# Patient Record
Sex: FEMALE | Race: WHITE | Employment: OTHER | URBAN - NONMETROPOLITAN AREA
[De-identification: names, ages, dates, MRNs, and addresses within clinical notes are randomized per-mention and may not be internally consistent; named-entity substitution may affect disease eponyms.]

---

## 2020-08-04 ENCOUNTER — APPOINTMENT (OUTPATIENT)
Dept: CT IMAGING | Age: 85
DRG: 552 | End: 2020-08-04
Payer: MEDICARE

## 2020-08-04 ENCOUNTER — HOSPITAL ENCOUNTER (INPATIENT)
Age: 85
LOS: 6 days | Discharge: NURSING, REHAB OR LONG-TERM CARE | DRG: 552 | End: 2020-08-10
Attending: EMERGENCY MEDICINE | Admitting: SURGERY
Payer: MEDICARE

## 2020-08-04 ENCOUNTER — APPOINTMENT (OUTPATIENT)
Dept: GENERAL RADIOLOGY | Age: 85
DRG: 552 | End: 2020-08-04
Payer: MEDICARE

## 2020-08-04 PROBLEM — S12.100A CLOSED DISPLACED FRACTURE OF SECOND CERVICAL VERTEBRA (HCC): Status: ACTIVE | Noted: 2020-08-04

## 2020-08-04 PROBLEM — S12.120A CLOSED TYPE III FRACTURE OF ODONTOID PROCESS (HCC): Status: ACTIVE | Noted: 2020-08-04

## 2020-08-04 PROBLEM — N30.00 ACUTE CYSTITIS WITHOUT HEMATURIA: Status: ACTIVE | Noted: 2020-08-04
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
## 2020-08-04 LAB
BACTERIA: ABNORMAL /HPF
BASOPHILS # BLD: 0.1 %
BASOPHILS ABSOLUTE: 0 THOU/MM3 (ref 0–0.1)
BILIRUBIN URINE: NEGATIVE
BLOOD, URINE: ABNORMAL
CASTS 2: ABNORMAL /LPF
CASTS UA: ABNORMAL /LPF
CHARACTER, URINE: CLEAR
COLOR: YELLOW
CRYSTALS, UA: ABNORMAL
EKG ATRIAL RATE: 75 BPM
EKG P AXIS: 62 DEGREES
EKG P-R INTERVAL: 184 MS
EKG Q-T INTERVAL: 420 MS
EKG QRS DURATION: 136 MS
EKG QTC CALCULATION (BAZETT): 469 MS
EKG R AXIS: -70 DEGREES
EKG T AXIS: 127 DEGREES
EKG VENTRICULAR RATE: 75 BPM
EOSINOPHIL # BLD: 0.1 %
EOSINOPHILS ABSOLUTE: 0 THOU/MM3 (ref 0–0.4)
EPITHELIAL CELLS, UA: ABNORMAL /HPF
ERYTHROCYTE [DISTWIDTH] IN BLOOD BY AUTOMATED COUNT: 13.8 % (ref 11.5–14.5)
ERYTHROCYTE [DISTWIDTH] IN BLOOD BY AUTOMATED COUNT: 49.1 FL (ref 35–45)
GLUCOSE BLD-MCNC: 103 MG/DL (ref 70–108)
GLUCOSE BLD-MCNC: 118 MG/DL (ref 70–108)
GLUCOSE BLD-MCNC: 123 MG/DL (ref 70–108)
GLUCOSE URINE: NEGATIVE MG/DL
HCT VFR BLD CALC: 37.4 % (ref 37–47)
HEMOGLOBIN: 11 GM/DL (ref 12–16)
IMMATURE GRANS (ABS): 0.04 THOU/MM3 (ref 0–0.07)
IMMATURE GRANULOCYTES: 0.4 %
KETONES, URINE: NEGATIVE
LEUKOCYTE ESTERASE, URINE: ABNORMAL
LYMPHOCYTES # BLD: 11.7 %
LYMPHOCYTES ABSOLUTE: 1.1 THOU/MM3 (ref 1–4.8)
MCH RBC QN AUTO: 28.4 PG (ref 26–33)
MCHC RBC AUTO-ENTMCNC: 29.4 GM/DL (ref 32.2–35.5)
MCV RBC AUTO: 96.6 FL (ref 81–99)
MISCELLANEOUS 2: ABNORMAL
MONOCYTES # BLD: 7.6 %
MONOCYTES ABSOLUTE: 0.7 THOU/MM3 (ref 0.4–1.3)
NITRITE, URINE: POSITIVE
NUCLEATED RED BLOOD CELLS: 0 /100 WBC
PH UA: 5 (ref 5–9)
PLATELET # BLD: 123 THOU/MM3 (ref 130–400)
PMV BLD AUTO: 10.1 FL (ref 9.4–12.4)
PROTEIN UA: NEGATIVE
RBC # BLD: 3.87 MILL/MM3 (ref 4.2–5.4)
RBC URINE: ABNORMAL /HPF
RENAL EPITHELIAL, UA: ABNORMAL
SEG NEUTROPHILS: 80.1 %
SEGMENTED NEUTROPHILS ABSOLUTE COUNT: 7.8 THOU/MM3 (ref 1.8–7.7)
SPECIFIC GRAVITY, URINE: 1.01 (ref 1–1.03)
UROBILINOGEN, URINE: 0.2 EU/DL (ref 0–1)
WBC # BLD: 9.8 THOU/MM3 (ref 4.8–10.8)
WBC UA: ABNORMAL /HPF
YEAST: ABNORMAL

## 2020-08-04 PROCEDURE — 6370000000 HC RX 637 (ALT 250 FOR IP): Performed by: NURSE PRACTITIONER

## 2020-08-04 PROCEDURE — 93005 ELECTROCARDIOGRAM TRACING: CPT | Performed by: NURSE PRACTITIONER

## 2020-08-04 PROCEDURE — 2580000003 HC RX 258: Performed by: NURSE PRACTITIONER

## 2020-08-04 PROCEDURE — 96374 THER/PROPH/DIAG INJ IV PUSH: CPT

## 2020-08-04 PROCEDURE — 6360000002 HC RX W HCPCS: Performed by: NURSE PRACTITIONER

## 2020-08-04 PROCEDURE — 6370000000 HC RX 637 (ALT 250 FOR IP)

## 2020-08-04 PROCEDURE — 99285 EMERGENCY DEPT VISIT HI MDM: CPT

## 2020-08-04 PROCEDURE — 99222 1ST HOSP IP/OBS MODERATE 55: CPT | Performed by: NEUROLOGICAL SURGERY

## 2020-08-04 PROCEDURE — 87077 CULTURE AEROBIC IDENTIFY: CPT

## 2020-08-04 PROCEDURE — 6820000001 HC L2 TRAUMA SURGERY EVALUATION

## 2020-08-04 PROCEDURE — APPSS180 APP SPLIT SHARED TIME > 60 MINUTES: Performed by: NURSE PRACTITIONER

## 2020-08-04 PROCEDURE — 82948 REAGENT STRIP/BLOOD GLUCOSE: CPT

## 2020-08-04 PROCEDURE — 71045 X-RAY EXAM CHEST 1 VIEW: CPT

## 2020-08-04 PROCEDURE — 85025 COMPLETE CBC W/AUTO DIFF WBC: CPT

## 2020-08-04 PROCEDURE — APPSS60 APP SPLIT SHARED TIME 46-60 MINUTES: Performed by: PHYSICIAN ASSISTANT

## 2020-08-04 PROCEDURE — 36415 COLL VENOUS BLD VENIPUNCTURE: CPT

## 2020-08-04 PROCEDURE — G0378 HOSPITAL OBSERVATION PER HR: HCPCS

## 2020-08-04 PROCEDURE — 2060000000 HC ICU INTERMEDIATE R&B

## 2020-08-04 PROCEDURE — 3209999900 CT INTERPRETATION OF OUTSIDE IMAGES

## 2020-08-04 PROCEDURE — 81001 URINALYSIS AUTO W/SCOPE: CPT

## 2020-08-04 PROCEDURE — 99222 1ST HOSP IP/OBS MODERATE 55: CPT | Performed by: SURGERY

## 2020-08-04 PROCEDURE — 74176 CT ABD & PELVIS W/O CONTRAST: CPT

## 2020-08-04 PROCEDURE — 87186 SC STD MICRODIL/AGAR DIL: CPT

## 2020-08-04 PROCEDURE — 87086 URINE CULTURE/COLONY COUNT: CPT

## 2020-08-04 RX ORDER — FUROSEMIDE 40 MG/1
40 TABLET ORAL DAILY
COMMUNITY

## 2020-08-04 RX ORDER — TRAMADOL HYDROCHLORIDE 50 MG/1
50 TABLET ORAL EVERY 6 HOURS PRN
Status: DISCONTINUED | OUTPATIENT
Start: 2020-08-04 | End: 2020-08-10 | Stop reason: HOSPADM

## 2020-08-04 RX ORDER — NITROGLYCERIN 0.4 MG/1
0.4 TABLET SUBLINGUAL EVERY 5 MIN PRN
Status: DISCONTINUED | OUTPATIENT
Start: 2020-08-04 | End: 2020-08-10 | Stop reason: HOSPADM

## 2020-08-04 RX ORDER — DILTIAZEM HYDROCHLORIDE 180 MG/1
180 CAPSULE, COATED, EXTENDED RELEASE ORAL DAILY
Status: DISCONTINUED | OUTPATIENT
Start: 2020-08-04 | End: 2020-08-10 | Stop reason: HOSPADM

## 2020-08-04 RX ORDER — SODIUM CHLORIDE 9 MG/ML
1000 INJECTION, SOLUTION INTRAVENOUS CONTINUOUS
Status: DISCONTINUED | OUTPATIENT
Start: 2020-08-04 | End: 2020-08-04 | Stop reason: SDUPTHER

## 2020-08-04 RX ORDER — ACETAMINOPHEN 500 MG
1000 TABLET ORAL NIGHTLY
COMMUNITY

## 2020-08-04 RX ORDER — DEXTROSE MONOHYDRATE 25 G/50ML
12.5 INJECTION, SOLUTION INTRAVENOUS PRN
Status: DISCONTINUED | OUTPATIENT
Start: 2020-08-04 | End: 2020-08-10 | Stop reason: HOSPADM

## 2020-08-04 RX ORDER — POLYETHYLENE GLYCOL 3350 17 G/17G
17 POWDER, FOR SOLUTION ORAL DAILY PRN
COMMUNITY

## 2020-08-04 RX ORDER — GRANULES FOR ORAL 3 G/1
3 POWDER ORAL ONCE
Status: COMPLETED | OUTPATIENT
Start: 2020-08-04 | End: 2020-08-04

## 2020-08-04 RX ORDER — MORPHINE SULFATE 2 MG/ML
2 INJECTION, SOLUTION INTRAMUSCULAR; INTRAVENOUS
Status: DISCONTINUED | OUTPATIENT
Start: 2020-08-04 | End: 2020-08-10 | Stop reason: HOSPADM

## 2020-08-04 RX ORDER — POLYETHYLENE GLYCOL 3350 17 G/17G
17 POWDER, FOR SOLUTION ORAL DAILY
Status: DISCONTINUED | OUTPATIENT
Start: 2020-08-04 | End: 2020-08-10 | Stop reason: HOSPADM

## 2020-08-04 RX ORDER — PROMETHAZINE HYDROCHLORIDE 25 MG/1
12.5 TABLET ORAL EVERY 6 HOURS PRN
Status: DISCONTINUED | OUTPATIENT
Start: 2020-08-04 | End: 2020-08-10 | Stop reason: HOSPADM

## 2020-08-04 RX ORDER — TRAMADOL HYDROCHLORIDE 50 MG/1
TABLET ORAL
Status: COMPLETED
Start: 2020-08-04 | End: 2020-08-04

## 2020-08-04 RX ORDER — SODIUM CHLORIDE 0.9 % (FLUSH) 0.9 %
10 SYRINGE (ML) INJECTION PRN
Status: DISCONTINUED | OUTPATIENT
Start: 2020-08-04 | End: 2020-08-10 | Stop reason: HOSPADM

## 2020-08-04 RX ORDER — FLUTICASONE PROPIONATE 50 MCG
1 SPRAY, SUSPENSION (ML) NASAL NIGHTLY
COMMUNITY

## 2020-08-04 RX ORDER — POLYETHYLENE GLYCOL 3350 17 G/17G
17 POWDER, FOR SOLUTION ORAL DAILY PRN
Status: DISCONTINUED | OUTPATIENT
Start: 2020-08-04 | End: 2020-08-10 | Stop reason: HOSPADM

## 2020-08-04 RX ORDER — POTASSIUM CHLORIDE 750 MG/1
10 TABLET, FILM COATED, EXTENDED RELEASE ORAL DAILY
COMMUNITY

## 2020-08-04 RX ORDER — METOPROLOL TARTRATE 50 MG/1
25 TABLET, FILM COATED ORAL 2 TIMES DAILY
Status: DISCONTINUED | OUTPATIENT
Start: 2020-08-04 | End: 2020-08-10 | Stop reason: HOSPADM

## 2020-08-04 RX ORDER — BUMETANIDE 1 MG/1
1 TABLET ORAL
Status: DISCONTINUED | OUTPATIENT
Start: 2020-08-05 | End: 2020-08-10 | Stop reason: HOSPADM

## 2020-08-04 RX ORDER — DEXTROSE MONOHYDRATE 50 MG/ML
100 INJECTION, SOLUTION INTRAVENOUS PRN
Status: DISCONTINUED | OUTPATIENT
Start: 2020-08-04 | End: 2020-08-10 | Stop reason: HOSPADM

## 2020-08-04 RX ORDER — MORPHINE SULFATE 4 MG/ML
4 INJECTION, SOLUTION INTRAMUSCULAR; INTRAVENOUS
Status: DISCONTINUED | OUTPATIENT
Start: 2020-08-04 | End: 2020-08-10 | Stop reason: HOSPADM

## 2020-08-04 RX ORDER — TRAMADOL HYDROCHLORIDE 50 MG/1
25 TABLET ORAL EVERY 6 HOURS PRN
Status: DISCONTINUED | OUTPATIENT
Start: 2020-08-04 | End: 2020-08-10 | Stop reason: HOSPADM

## 2020-08-04 RX ORDER — ACETAMINOPHEN 325 MG/1
650 TABLET ORAL EVERY 6 HOURS PRN
COMMUNITY

## 2020-08-04 RX ORDER — FAMOTIDINE 20 MG/1
20 TABLET, FILM COATED ORAL 2 TIMES DAILY
Status: DISCONTINUED | OUTPATIENT
Start: 2020-08-04 | End: 2020-08-04 | Stop reason: CLARIF

## 2020-08-04 RX ORDER — ISOSORBIDE MONONITRATE 30 MG/1
30 TABLET, EXTENDED RELEASE ORAL DAILY
Status: DISCONTINUED | OUTPATIENT
Start: 2020-08-04 | End: 2020-08-10 | Stop reason: HOSPADM

## 2020-08-04 RX ORDER — LIDOCAINE 50 MG/G
1 PATCH TOPICAL DAILY
COMMUNITY

## 2020-08-04 RX ORDER — ONDANSETRON 2 MG/ML
4 INJECTION INTRAMUSCULAR; INTRAVENOUS EVERY 6 HOURS PRN
Status: DISCONTINUED | OUTPATIENT
Start: 2020-08-04 | End: 2020-08-10 | Stop reason: HOSPADM

## 2020-08-04 RX ORDER — SERTRALINE HYDROCHLORIDE 100 MG/1
100 TABLET, FILM COATED ORAL NIGHTLY
COMMUNITY

## 2020-08-04 RX ORDER — FOLIC ACID/VIT B COMPLEX AND C 5 MG
1 TABLET ORAL DAILY
Status: DISCONTINUED | OUTPATIENT
Start: 2020-08-04 | End: 2020-08-10 | Stop reason: HOSPADM

## 2020-08-04 RX ORDER — SODIUM CHLORIDE 0.9 % (FLUSH) 0.9 %
10 SYRINGE (ML) INJECTION EVERY 12 HOURS SCHEDULED
Status: DISCONTINUED | OUTPATIENT
Start: 2020-08-04 | End: 2020-08-10 | Stop reason: HOSPADM

## 2020-08-04 RX ORDER — SODIUM CHLORIDE 9 MG/ML
INJECTION, SOLUTION INTRAVENOUS CONTINUOUS
Status: DISCONTINUED | OUTPATIENT
Start: 2020-08-04 | End: 2020-08-06

## 2020-08-04 RX ORDER — ATORVASTATIN CALCIUM 40 MG/1
40 TABLET, FILM COATED ORAL NIGHTLY
COMMUNITY

## 2020-08-04 RX ORDER — THIAMINE MONONITRATE (VIT B1) 100 MG
100 TABLET ORAL DAILY
COMMUNITY

## 2020-08-04 RX ORDER — NICOTINE POLACRILEX 4 MG
15 LOZENGE BUCCAL PRN
Status: DISCONTINUED | OUTPATIENT
Start: 2020-08-04 | End: 2020-08-10 | Stop reason: HOSPADM

## 2020-08-04 RX ORDER — OMEGA-3-ACID ETHYL ESTERS 1 G/1
2000 CAPSULE, LIQUID FILLED ORAL DAILY
Status: DISCONTINUED | OUTPATIENT
Start: 2020-08-04 | End: 2020-08-10 | Stop reason: HOSPADM

## 2020-08-04 RX ORDER — GLYBURIDE 2.5 MG/1
2.5 TABLET ORAL 2 TIMES DAILY WITH MEALS
Status: DISCONTINUED | OUTPATIENT
Start: 2020-08-04 | End: 2020-08-04

## 2020-08-04 RX ORDER — PANTOPRAZOLE SODIUM 40 MG/1
40 TABLET, DELAYED RELEASE ORAL
Status: DISCONTINUED | OUTPATIENT
Start: 2020-08-05 | End: 2020-08-10 | Stop reason: HOSPADM

## 2020-08-04 RX ORDER — LOSARTAN POTASSIUM 25 MG/1
25 TABLET ORAL DAILY
Status: DISCONTINUED | OUTPATIENT
Start: 2020-08-04 | End: 2020-08-10 | Stop reason: HOSPADM

## 2020-08-04 RX ORDER — ROSUVASTATIN CALCIUM 20 MG/1
20 TABLET, COATED ORAL DAILY
Status: DISCONTINUED | OUTPATIENT
Start: 2020-08-04 | End: 2020-08-10 | Stop reason: HOSPADM

## 2020-08-04 RX ORDER — ACETAMINOPHEN 325 MG/1
650 TABLET ORAL EVERY 4 HOURS PRN
Status: DISCONTINUED | OUTPATIENT
Start: 2020-08-04 | End: 2020-08-10 | Stop reason: HOSPADM

## 2020-08-04 RX ADMIN — SODIUM CHLORIDE, PRESERVATIVE FREE 10 ML: 5 INJECTION INTRAVENOUS at 13:58

## 2020-08-04 RX ADMIN — ISOSORBIDE MONONITRATE 30 MG: 30 TABLET ORAL at 20:35

## 2020-08-04 RX ADMIN — TRAMADOL HYDROCHLORIDE 25 MG: 50 TABLET, FILM COATED ORAL at 11:12

## 2020-08-04 RX ADMIN — TRAMADOL HYDROCHLORIDE 50 MG: 50 TABLET, FILM COATED ORAL at 20:35

## 2020-08-04 RX ADMIN — ONDANSETRON 4 MG: 2 INJECTION INTRAMUSCULAR; INTRAVENOUS at 14:00

## 2020-08-04 RX ADMIN — ROSUVASTATIN CALCIUM 20 MG: 20 TABLET, FILM COATED ORAL at 20:34

## 2020-08-04 RX ADMIN — FOSFOMYCIN TROMETHAMINE 1 PACKET: 3 POWDER ORAL at 14:04

## 2020-08-04 RX ADMIN — METOPROLOL TARTRATE 25 MG: 50 TABLET, FILM COATED ORAL at 20:35

## 2020-08-04 RX ADMIN — DILTIAZEM HYDROCHLORIDE 180 MG: 180 CAPSULE, COATED, EXTENDED RELEASE ORAL at 20:35

## 2020-08-04 RX ADMIN — LOSARTAN POTASSIUM 25 MG: 25 TABLET, FILM COATED ORAL at 20:35

## 2020-08-04 RX ADMIN — SODIUM CHLORIDE: 9 INJECTION, SOLUTION INTRAVENOUS at 13:54

## 2020-08-04 RX ADMIN — MORPHINE SULFATE 2 MG: 2 INJECTION, SOLUTION INTRAMUSCULAR; INTRAVENOUS at 14:01

## 2020-08-04 SDOH — HEALTH STABILITY: MENTAL HEALTH: HOW OFTEN DO YOU HAVE A DRINK CONTAINING ALCOHOL?: NEVER

## 2020-08-04 ASSESSMENT — ENCOUNTER SYMPTOMS
EYE PAIN: 0
SHORTNESS OF BREATH: 0
ANAL BLEEDING: 0
STRIDOR: 0
CHOKING: 0
APNEA: 0
SINUS PRESSURE: 0
EYE ITCHING: 0
COUGH: 0
CHEST TIGHTNESS: 0
SORE THROAT: 0
EYE REDNESS: 0
BLOOD IN STOOL: 0
TROUBLE SWALLOWING: 0
PHOTOPHOBIA: 0
DIARRHEA: 0
VOMITING: 0
ABDOMINAL DISTENTION: 0
BACK PAIN: 0
RHINORRHEA: 0
ABDOMINAL PAIN: 0
WHEEZING: 0
RECTAL PAIN: 0
EYE DISCHARGE: 0
VOICE CHANGE: 0
COLOR CHANGE: 0
ABDOMINAL PAIN: 1
NAUSEA: 0
FACIAL SWELLING: 0
CONSTIPATION: 0

## 2020-08-04 ASSESSMENT — PAIN DESCRIPTION - PAIN TYPE
TYPE: ACUTE PAIN

## 2020-08-04 ASSESSMENT — PAIN SCALES - GENERAL
PAINLEVEL_OUTOF10: 7
PAINLEVEL_OUTOF10: 5
PAINLEVEL_OUTOF10: 6
PAINLEVEL_OUTOF10: 4
PAINLEVEL_OUTOF10: 0
PAINLEVEL_OUTOF10: 6
PAINLEVEL_OUTOF10: 6
PAINLEVEL_OUTOF10: 5

## 2020-08-04 ASSESSMENT — PAIN DESCRIPTION - LOCATION
LOCATION: BACK
LOCATION: HEAD
LOCATION: NECK
LOCATION: NECK

## 2020-08-04 ASSESSMENT — PAIN - FUNCTIONAL ASSESSMENT
PAIN_FUNCTIONAL_ASSESSMENT: PREVENTS OR INTERFERES SOME ACTIVE ACTIVITIES AND ADLS
PAIN_FUNCTIONAL_ASSESSMENT: PREVENTS OR INTERFERES SOME ACTIVE ACTIVITIES AND ADLS
PAIN_FUNCTIONAL_ASSESSMENT: PREVENTS OR INTERFERES WITH MANY ACTIVE NOT PASSIVE ACTIVITIES

## 2020-08-04 ASSESSMENT — PAIN DESCRIPTION - PROGRESSION
CLINICAL_PROGRESSION: NOT CHANGED
CLINICAL_PROGRESSION: GRADUALLY WORSENING
CLINICAL_PROGRESSION: NOT CHANGED
CLINICAL_PROGRESSION: GRADUALLY IMPROVING

## 2020-08-04 ASSESSMENT — PAIN DESCRIPTION - DESCRIPTORS
DESCRIPTORS: ACHING;SORE;SHARP
DESCRIPTORS: ACHING
DESCRIPTORS: OTHER (COMMENT)

## 2020-08-04 ASSESSMENT — PAIN DESCRIPTION - ORIENTATION
ORIENTATION: ANTERIOR
ORIENTATION: LOWER

## 2020-08-04 ASSESSMENT — PAIN DESCRIPTION - FREQUENCY
FREQUENCY: INTERMITTENT
FREQUENCY: CONTINUOUS
FREQUENCY: CONTINUOUS

## 2020-08-04 ASSESSMENT — PAIN DESCRIPTION - ONSET
ONSET: ON-GOING
ONSET: ON-GOING
ONSET: GRADUAL

## 2020-08-04 NOTE — H&P
Trauma H&P  Dr. Geoffrey Ward     Patient:  Citlalli Valdez date: 8/4/2020   YOB: 1929 Date of Evaluation: 8/4/2020  MRN: [id number]  Acct: [de-identified]    Injury Date:08/03/20  Injury time: Morning  PCP: Alejandra Khalil MD   Referring physician: ER provider    Time of Trauma Surgeon Notification:  0986  Time of Trama LUPILLO Arrival: 0850  Time of Trauma Surgeon Arrival:  ~1100    Assessment:    Trauma by fall   Type III dens fracture  UTI  Abdominal pain  Plan:    Admit to 4A    Type III dens fracture   - Consult Neurosurgery   - Place [place] collar   - C-spine precautions   - Neuro checks   - Pain management   - CTA to R/O vertebral artery injury   - MRI of the cervical spine if pacemaker can be shut off     UTI   - Fosfomycin x1 dose    Abdominal pain   - Possible blood vs radiopaque medication in cecum   - Check CBC later this AM and again tomorrow AM    CAD, hyperlipidemia, Hx of pacemaker for SSS   - Resume home medications   - Consult Cardiology for clearance to shut off pacer for MRI    Pain management   - Ultram or Morphine PRN    Clear liquid diet, advance as tolerated  IVF hydration  Repeat labs in AM  Prophylaxis: SCDs, IS, C&DB, Pepcid, Miralax  PT, OT and SLP to evaluate and treat   consult - pt will need rehab side of [place]  Discharge dispo pending clinical course   - From [place] - will need continued rehab     Activation: []Level I (Trauma Alert) []Level II (Injury Call) [x]Level III (Trauma Consult) []Downgraded  Mode of Arrival: EMS transportation  Referring Facility: [facility]  Loss of Consciousness [x]No []Yes[]Unknown    Mechanism of Injury:  []Motor Vehicle crash   []Single Vehicle [] []Passenger []Scene Fatality []Front Seat  []Restrained   []Air Bag Deployed   []Ejected []Rollover []Pedestrian []Trapped   Type of vehicle:   Protective Devices:   []Motorcycle  Wearing Helmet []Yes []No  []Bicycle  Wearing Helmet []Yes []No  [x]Fall level: None   Occupational History    None   Social Needs    Financial resource strain: None    Food insecurity     Worry: None     Inability: None    Transportation needs     Medical: None     Non-medical: None   Tobacco Use    Smoking status: Never Smoker    Smokeless tobacco: Never Used   Substance and Sexual Activity    Alcohol use: None    Drug use: None    Sexual activity: None   Lifestyle    Physical activity     Days per week: None     Minutes per session: None    Stress: None   Relationships    Social connections     Talks on phone: None     Gets together: None     Attends Restorationism service: None     Active member of club or organization: None     Attends meetings of clubs or organizations: None     Relationship status: None    Intimate partner violence     Fear of current or ex partner: None     Emotionally abused: None     Physically abused: None     Forced sexual activity: None   Other Topics Concern    None   Social History Narrative    None     History reviewed. No pertinent family history. Home medications:    Previous Medications    ASPIRIN 81 MG EC TABLET    Take 81 mg by mouth daily. B COMPLEX VITAMINS (VITAMIN B COMPLEX PO)    Take  by mouth daily. BUMETANIDE (BUMEX) 1 MG TABLET    Take 1 mg by mouth three times a week. CELECOXIB (CELEBREX) 400 MG CAPSULE    Take 400 mg by mouth daily. CLOPIDOGREL (PLAVIX) 75 MG TABLET    Take 75 mg by mouth daily. COENZYME Q10 (CO Q 10) 100 MG CAPS    Take  by mouth daily. DILTIAZEM (DILACOR XR) 180 MG XR CAPSULE    Take 180 mg by mouth daily. ESOMEPRAZOLE (NEXIUM) 20 MG CAPSULE    Take 20 mg by mouth daily. GLYBURIDE (DIABETA) 2.5 MG TABLET    Take 1 tablet by mouth 2 times daily (with meals). ISOSORBIDE MONONITRATE (IMDUR) 30 MG CR TABLET    Take 30 mg by mouth daily. LOSARTAN (COZAAR) 25 MG TABLET    Take 25 mg by mouth daily.     METFORMIN (GLUCOPHAGE) 500 MG TABLET    Take 1 tablet by mouth 2 times daily (with Patient has pacemaker need to evaluate to see if it can be turned off and patient have MRI to evaluate for possible ligamentous injury. Recommendations per neurosurgery will need some type of rehab care she currently is in assisted living. All data and imaging reviewed. Agree as documented. Care coordinated directly with SUSAN Olivo.

## 2020-08-04 NOTE — PROGRESS NOTES
Pt admitted to  [address] [place] [address] from ED. Complaints: None. IV site free of s/s of infection or infiltration. Vital signs obtained. Assessment and data collection initiated. Two nurse skin assessment performed by Halina Chairez and Dany. Oriented to room. Policies and procedures for 4A explained. All questions answered with no further questions at this time. Fall prevention and safety brochure discussed with patient. Bed alarm on.  Call light in reach.      @ 8/4/2020 4:48 PM

## 2020-08-04 NOTE — ED NOTES
ED to inpatient nurses report    Chief Complaint   Patient presents with    Fall    Neck Injury     C2 FX      Present to ED from nursing home  LOC: alert and orientated to name, place, date  Vital signs   Vitals:    08/04/20 0731 08/04/20 0907   BP: (!) 161/71 (!) 155/64   Pulse: 76 71   Resp: 20 18   Temp: 98.1 °F (36.7 °C)    TempSrc: Oral    SpO2: 94% 93%   Weight: 155 lb (70.3 kg)    Height: 5' 5\" (1.651 m)       Oxygen Baseline RA    Current needs required 2 L NC Bipap/Cpap No  LDAs:   Peripheral IV 08/04/20 Antecubital (Active)   Site Assessment Clean;Dry; Intact 08/04/20 0738     Mobility: Requires assistance * 2  Pending ED orders: NA  Present condition: STABLE    Electronically signed by Juan C Godwin RN on 8/4/2020 at 9:27 AM     Juan C Godwin RN  08/04/20 6322

## 2020-08-04 NOTE — ED NOTES
Patient transported to Radiology department via [facility] tech in stable condition.        Candido Ludwig, ZHANNA  08/04/20 9271

## 2020-08-04 NOTE — ED NOTES
Spoke with Joyce Damon with [facility]. States she needs an order from cardiologist to turn pacemaker off for MRI.       Minor Mohr RN  08/04/20 1968

## 2020-08-04 NOTE — ED PROVIDER NOTES
[facility] ENCOUNTER        Pt Name: Ruby Osborn  MRN: [id number]  Jayleengfjosé 12/16/1929  Date of evaluation: 8/4/2020  Treating Resident Physician: Mckeon MD  Supervising Physician: Ancelmo Dubose MD    [address] [place] [address]       Chief Complaint   Patient presents with    Fall    Neck Injury     C2 FX     Patient interviewed and examined by me immediately on arrival.        Further information obtained after primary survey and initial critical actions were performed. History obtained from the patient and daughter. Patient had a fall at nursing home from standing, yesterday morning, striking her head on the ground. Patient denies loss of consciousness. Patient has continued posterior neck pain since that time. Early this morning, she went to [facility], where she was found to have a type III odontoid fracture. Patient was transferred to us at that point. Upon presentation, trauma and spine were consulted. Patient did not have hearing aids with her, and was only able to answer simple questions. Patient denied any pain except her neck. Patient denied any difficulty breathing. Patient denied any paresthesias throughout her body. PRIMARY SURVEY AND INTERVENTION   Airway: Patent. Breathing: Regular respiratory pattern, symmetric chest rise, lungs clear to auscultation. Circulation: Good capillary refill, no identifiable external bleeding, good pulses in all extremities. Exposure: No additional injuries identified. Disability: No focal neurological deficit. Patient awake. eFAST: Per Dr. Carina Sullivan, some possible free fluid on LUQ. no pericardial effusion, no identifiable pneumothorax. See full report below. INITIAL MEDICAL DECISION MAKING   Initial assessment: 80-year-old female past medical history of pacemaker placement,.   PLAN: Large bore IV lines, cardiac/respiratory monitoring, labs, analgesia, trauma team activated prior to arrival, bedside US, observation. SECONDARY SURVEY AND INTERVENTION  HISTORY OF PRESENT ILLNESS    HPI  Brie Gerer is a 80 y.o. female who presents to the emergency department transferred from [facility] for evaluation of type III odontoid process fracture after mechanical fall. Patient is now attesting to a small amount of left-sided abdominal and pelvic pain. Denies any other complaints. \    Patient came from [facility] with CT of head and neck, showing type III odontoid fracture. The patient has no other acute complaints at this time. REVIEW OF SYSTEMS   Review of Systems   Constitutional: Negative for chills, fatigue and fever. HENT: Negative for trouble swallowing and voice change. Eyes: Negative for photophobia and visual disturbance. Respiratory: Negative for apnea, cough, choking, chest tightness, shortness of breath, wheezing and stridor. Cardiovascular: Negative for chest pain, palpitations and leg swelling. Gastrointestinal: Positive for abdominal pain (Mild left-sided). Negative for abdominal distention, anal bleeding, blood in stool, constipation, diarrhea, nausea, rectal pain and vomiting. Endocrine: Negative for polyphagia and polyuria. Genitourinary: Positive for pelvic pain (Left-sided). Negative for difficulty urinating, dysuria, flank pain, frequency and urgency. Musculoskeletal: Positive for neck pain and neck stiffness. Negative for gait problem (Was walking, though painfully, until came to [facility] ED.) and joint swelling. Neurological: Negative for dizziness, tremors, seizures, syncope, facial asymmetry, speech difficulty, weakness, light-headedness, numbness and headaches.          PAST MEDICAL AND SURGICAL HISTORY     Past Medical History:   Diagnosis Date    Anemia     Arthritis     Atherosclerotic heart disease     CHF (congestive heart failure) ([facility])     Chronic kidney disease, stage III (moderate) ([facility])     Constipation Allergen Reactions    Fluoxetine     Levofloxacin     Pcn [Penicillins]     Quinolones      Unable to confirm at this moment, Except as available on EMR. FAMILY HISTORY   History reviewed. No pertinent family history. Unable to confirm at this moment, Except as available on EMR. PREVIOUS RECORDS   Previous records reviewed: Past medical history, past surgical history, allergies, social history        PHYSICAL EXAM     ED Triage Vitals [08/04/20 0731]   BP Temp Temp Source Pulse Resp SpO2 Height Weight   (!) 161/71 98.1 °F (36.7 °C) Oral 76 20 94 % 5' 5\" (1.651 m) 155 lb (70.3 kg)     Initial vital signs and nursing assessment reviewed and normal. Pulsoximetry is normal per my interpretation. Additional Vital Signs:  Vitals:    08/05/20 0400   BP:    Pulse:    Resp:    Temp:    SpO2: 96%       EKG monitor: Normal sinus rhythm    Physical Exam  Constitutional:       General: She is not in acute distress. Appearance: Normal appearance. She is normal weight. She is not ill-appearing, toxic-appearing or diaphoretic. HENT:      Head: Normocephalic. Comments: Small abrasion to right forehead. No active bleeding. Right Ear: Tympanic membrane and external ear normal.      Left Ear: Tympanic membrane and external ear normal.      Nose: Nose normal.      Mouth/Throat:      Mouth: Mucous membranes are moist.      Pharynx: Oropharynx is clear. Eyes:      Extraocular Movements: Extraocular movements intact. Conjunctiva/sclera: Conjunctivae normal.      Pupils: Pupils are equal, round, and reactive to light. Neck:      Comments: And C-spine restrictions, due to fracture. Did not evaluate stability nor range of motion. Cardiovascular:      Rate and Rhythm: Normal rate and regular rhythm. Pulses: Normal pulses. Heart sounds: Normal heart sounds. Pulmonary:      Effort: Pulmonary effort is normal.      Breath sounds: Normal breath sounds.    Abdominal:      General: Abdomen is the upper C2 vertebral body extending into the left lateral mass with displacement of fragments into the left C2 foramen transversarium result in a moderate to severe stenosis of the foramen. Recommend neck CTA to evaluate    for possible left vertebral artery injury. Multilevel degenerative disc disease and DJD. Multilevel disc osteophyte complexes with varying severity spinal stenosis and cord impingement at C4-5. **This report has been created using voice recognition software. It may contain minor errors which are inherent in voice recognition technology. **      Final report electronically signed by Dr. Alexandru Christianson on 8/4/2020 9:25 PM      CT ABDOMEN PELVIS WO CONTRAST Additional Contrast? None   Final Result   1. Very small amount of high-density fluid in the cecum which may be related to radiopaque medication intraluminal blood is not entirely excludable. 2. No evidence of acute fracture or suspicious bone lesion. 3. Remote nonunion fracture left ninth rib   Other chronic findings are detailed in the above report            **This report has been created using voice recognition software. It may contain minor errors which are inherent in voice recognition technology. **      Final report electronically signed by Dr. Reji Leyva on 8/4/2020 9:10 AM      XR CHEST PORTABLE   Final Result   1. There is no acute cardiopulmonary process. 2. If there is clinical concern for rib fractures, then a CT examination of the chest would be recommended. **This report has been created using voice recognition software. It may contain minor errors which are inherent in voice recognition technology. **      Final report electronically signed by Dr. Cedric Hays on 8/4/2020 8:16 AM          ED Medications administered this visit:   Medications   sodium chloride flush 0.9 % injection 10 mL (10 mLs Intravenous Not Given 8/4/20 2034)   sodium chloride flush 0.9 % injection 10 mL (has no administration in time range)   acetaminophen (TYLENOL) tablet 650 mg (has no administration in time range)   polyethylene glycol (GLYCOLAX) packet 17 g (has no administration in time range)   promethazine (PHENERGAN) tablet 12.5 mg ( Oral See Alternative 8/4/20 1400)     Or   ondansetron (ZOFRAN) injection 4 mg (4 mg Intravenous Given 8/4/20 1400)   0.9 % sodium chloride infusion ( Intravenous New Bag 8/4/20 1354)   traMADol (ULTRAM) tablet 25 mg ( Oral See Alternative 8/5/20 0343)     Or   traMADol (ULTRAM) tablet 50 mg (50 mg Oral Given 8/5/20 0343)   morphine (PF) injection 2 mg (2 mg Intravenous Given 8/4/20 1401)     Or   morphine injection 4 mg ( Intravenous See Alternative 8/4/20 1401)   polyethylene glycol (GLYCOLAX) packet 17 g (has no administration in time range)   glucose (GLUTOSE) 40 % oral gel 15 g (has no administration in time range)   dextrose 50 % IV solution (has no administration in time range)   glucagon (rDNA) injection 1 mg (has no administration in time range)   dextrose 5 % solution (has no administration in time range)   insulin lispro (HUMALOG) injection vial 0-6 Units (0 Units Subcutaneous Not Given 8/4/20 1837)   insulin lispro (HUMALOG) injection vial 0-3 Units (0 Units Subcutaneous Not Given 8/4/20 2012)   pantoprazole (PROTONIX) tablet 40 mg (40 mg Oral Given 8/5/20 0623)   folbee plus tablet 1 tablet (1 tablet Oral Not Given 8/4/20 1840)   bumetanide (BUMEX) tablet 1 mg (has no administration in time range)   dilTIAZem (CARDIZEM CD) extended release capsule 180 mg (180 mg Oral Given 8/4/20 2035)   isosorbide mononitrate (IMDUR) extended release tablet 30 mg (30 mg Oral Given 8/4/20 2035)   losartan (COZAAR) tablet 25 mg (25 mg Oral Given 8/4/20 2035)   metoprolol tartrate (LOPRESSOR) tablet 25 mg (25 mg Oral Given 8/4/20 2035)   nitroGLYCERIN (NITROSTAT) SL tablet 0.4 mg (has no administration in time range)   omega-3 acid ethyl esters (LOVAZA) capsule 2,000 mg (2,000 mg Oral Not Given 8/4/20 1838)   rosuvastatin (CRESTOR) tablet 20 mg (20 mg Oral Given 8/4/20 2034)   fosfomycin tromethamine (MONUROL) 3 g PACK 1 packet (1 packet Oral Given 8/4/20 1404)   iopamidol (ISOVUE-370) 76 % injection 80 mL (80 mLs Intravenous Given 8/5/20 0040)           POCUS eFAST   Date/Time: 08/05/20, refer to note by Dr. Fred Flores     Current Discharge Medication List            FINAL DISPOSITION     Final diagnoses:   Closed odontoid fracture with type III morphology, initial encounter ([facility] [address]     Condition: condition: stable  Dispo: Admit to trauma with spine consult      This transcription was electronically signed. It was dictated by use of voice recognition software and electronically transcribed. The transcription may contain errors not detected in proofreading.      Yazmin Mera MD  Resident  08/04/20 [address], [place]  08/05/20 3382

## 2020-08-04 NOTE — ED PROVIDER NOTES
Transfer of Care Note:   Physician Signing out: Seth Stack MD  Receiving Physician: Ladan Shirley DO  Sign out time: 1500      Brief history: This is a 79 y/o F who had a mechanical fall from standing onto the left side of the head yesterday AM. She had neck pain since the fall. Sought care at [facility] early this morning, and was diagnosed with a type 3 odontoid fx. She is admitted to the trauma service with spine consultation. Items pending that need to be checked:  None, she is tentatively admitted    Tentative Impression of patient:  1. Type III Odontoid Fracture    Expected disposition of patient:  Pending results, admitted.         Additional Assessment and results:       Vitals:    08/04/20 1403   BP: (!) 144/65   Pulse: 75   Resp: 11   Temp:    SpO2: 93%         Labs Reviewed   CBC WITH AUTO DIFFERENTIAL - Abnormal; Notable for the following components:       Result Value    RBC 3.87 (*)     Hemoglobin 11.0 (*)     MCHC 29.4 (*)     RDW-SD 49.1 (*)     Platelets 117 (*)     Segs Absolute 7.8 (*)     All other components within normal limits   URINE WITH REFLEXED MICRO - Abnormal; Notable for the following components:    Blood, Urine TRACE (*)     Nitrite, Urine POSITIVE (*)     Leukocyte Esterase, Urine MODERATE (*)     All other components within normal limits   POCT GLUCOSE - Abnormal; Notable for the following components:    POC Glucose 123 (*)     All other components within normal limits   CULTURE, REFLEXED, URINE    Narrative:     Source: urine, clean catch       Site:           Current Antibiotics: not stated   POCT GLUCOSE   POCT GLUCOSE         Medications   sodium chloride flush 0.9 % injection 10 mL (10 mLs Intravenous Given 8/4/20 1358)   sodium chloride flush 0.9 % injection 10 mL (has no administration in time range)   acetaminophen (TYLENOL) tablet 650 mg (has no administration in time range)   polyethylene glycol (GLYCOLAX) packet 17 g (has no administration in time range) transcription was electronically signed. It was dictated by use of voice recognition software and electronically transcribed. The transcription may contain errors not detected in proofreading.         Nuha Cunningham,   Resident  08/04/20 Nima Forbes,   Resident  08/04/20 0518

## 2020-08-04 NOTE — ED NOTES
Patient placed in aspen collar while maintaining cervical spine precautions      Nahum Gooden RN  08/04/20 6681

## 2020-08-04 NOTE — ED NOTES
Reassessment of the patients Fall and Neck Injury (C2 FX)   is unchanged, the patients pain reassessment is a 4/10, Side rails up times 2, call light in reach, will continue to monitor.      Marya Mejias RN  08/04/20 0103

## 2020-08-04 NOTE — ED NOTES
Patient resting in bed. Respirations easy and unlabored. No distress noted. Call light within reach. Updated on POC. Lights dimmed for comfort.       Vale Thakkar RN  08/04/20 0137

## 2020-08-04 NOTE — ED NOTES
ED to inpatient nurses report    Chief Complaint   Patient presents with    Fall    Neck Injury     C2 FX      Present to ED from nursing home  LOC: alert and orientated to name, place, date  Vital signs   Vitals:    08/04/20 0731 08/04/20 0907 08/04/20 1050 08/04/20 1403   BP: (!) 161/71 (!) 155/64 (!) 151/65 (!) 144/65   Pulse: 76 71 70 75   Resp: 20 18 16 11   Temp: 98.1 °F (36.7 °C)      TempSrc: Oral      SpO2: 94% 93% 93% 93%   Weight: 155 lb (70.3 kg)      Height: 5' 5\" (1.651 m)         Oxygen Baseline room air    Current needs required room air Bipap/Cpap No  LDAs:   Peripheral IV 08/04/20 Antecubital (Active)   Site Assessment Clean;Dry; Intact 08/04/20 0738     Mobility: Requires assistance * 2  Pending ED orders:   Present condition: stable    Electronically signed by Lenord Cranker, RN on 8/4/2020 at 4:07 PM       Lenord Cranker, RN  08/04/20 7216

## 2020-08-04 NOTE — PROGRESS NOTES
[facility]  SPEECH THERAPY MISSED TREATMENT NOTE  [facility] EMERGENCY DEPT      Date: 2020  Patient Name: Jerman Mckeon        MRN: [id number]    : 1929  (80 y.o.)    REASON FOR MISSED TREATMENT:  Novel order received from SUSAN Gerber given s/p fall. C2 fracture noted within chart review with consult to neurosurgery placed. Not appropriate for completion of assessments at this time given heightened acuity levels and incomplete H&P. Will f/u on  to complete assessments as indicated.       Alan Bautista M.A., [address] [place] [address]

## 2020-08-04 NOTE — ED NOTES
Patent transferred from [facility] for C2 fx. Patient lives at a assisted living and fell getting out of bed. Patient states she fell two days ago and had a headed of 10/10. On arrival to ER patient is alert and oriented. Patient has full PMS in all four extremities.  Calvillo catheter placed call light within reach     Maday Luciano RN  08/04/20 1672

## 2020-08-04 NOTE — PROCEDURES
EKG was handed to [facility],  [facility].

## 2020-08-05 ENCOUNTER — APPOINTMENT (OUTPATIENT)
Dept: CT IMAGING | Age: 85
DRG: 552 | End: 2020-08-05
Payer: MEDICARE

## 2020-08-05 LAB
ALBUMIN SERPL-MCNC: 3.5 G/DL (ref 3.5–5.1)
ALP BLD-CCNC: 72 U/L (ref 38–126)
ALT SERPL-CCNC: 14 U/L (ref 11–66)
ANION GAP SERPL CALCULATED.3IONS-SCNC: 12 MEQ/L (ref 8–16)
AST SERPL-CCNC: 13 U/L (ref 5–40)
BILIRUB SERPL-MCNC: 0.5 MG/DL (ref 0.3–1.2)
BUN BLDV-MCNC: 30 MG/DL (ref 7–22)
CALCIUM SERPL-MCNC: 8.1 MG/DL (ref 8.5–10.5)
CHLORIDE BLD-SCNC: 106 MEQ/L (ref 98–111)
CO2: 19 MEQ/L (ref 23–33)
CREAT SERPL-MCNC: 1.2 MG/DL (ref 0.4–1.2)
ERYTHROCYTE [DISTWIDTH] IN BLOOD BY AUTOMATED COUNT: 13.9 % (ref 11.5–14.5)
ERYTHROCYTE [DISTWIDTH] IN BLOOD BY AUTOMATED COUNT: 50 FL (ref 35–45)
GFR SERPL CREATININE-BSD FRML MDRD: 42 ML/MIN/1.73M2
GLUCOSE BLD-MCNC: 117 MG/DL (ref 70–108)
GLUCOSE BLD-MCNC: 186 MG/DL (ref 70–108)
GLUCOSE BLD-MCNC: 92 MG/DL (ref 70–108)
GLUCOSE BLD-MCNC: 92 MG/DL (ref 70–108)
GLUCOSE BLD-MCNC: 97 MG/DL (ref 70–108)
HCT VFR BLD CALC: 34.7 % (ref 37–47)
HEMOGLOBIN: 10.5 GM/DL (ref 12–16)
MCH RBC QN AUTO: 29.5 PG (ref 26–33)
MCHC RBC AUTO-ENTMCNC: 30.3 GM/DL (ref 32.2–35.5)
MCV RBC AUTO: 97.5 FL (ref 81–99)
PLATELET # BLD: 125 THOU/MM3 (ref 130–400)
PMV BLD AUTO: 10.8 FL (ref 9.4–12.4)
POTASSIUM REFLEX MAGNESIUM: 4.1 MEQ/L (ref 3.5–5.2)
RBC # BLD: 3.56 MILL/MM3 (ref 4.2–5.4)
SODIUM BLD-SCNC: 137 MEQ/L (ref 135–145)
TOTAL PROTEIN: 6 G/DL (ref 6.1–8)
WBC # BLD: 8.9 THOU/MM3 (ref 4.8–10.8)

## 2020-08-05 PROCEDURE — 2580000003 HC RX 258: Performed by: NURSE PRACTITIONER

## 2020-08-05 PROCEDURE — 36415 COLL VENOUS BLD VENIPUNCTURE: CPT

## 2020-08-05 PROCEDURE — 92610 EVALUATE SWALLOWING FUNCTION: CPT

## 2020-08-05 PROCEDURE — 97163 PT EVAL HIGH COMPLEX 45 MIN: CPT

## 2020-08-05 PROCEDURE — 96376 TX/PRO/DX INJ SAME DRUG ADON: CPT

## 2020-08-05 PROCEDURE — 80053 COMPREHEN METABOLIC PANEL: CPT

## 2020-08-05 PROCEDURE — 6360000004 HC RX CONTRAST MEDICATION: Performed by: NEUROLOGICAL SURGERY

## 2020-08-05 PROCEDURE — 92526 ORAL FUNCTION THERAPY: CPT

## 2020-08-05 PROCEDURE — G0378 HOSPITAL OBSERVATION PER HR: HCPCS

## 2020-08-05 PROCEDURE — 70498 CT ANGIOGRAPHY NECK: CPT

## 2020-08-05 PROCEDURE — 97530 THERAPEUTIC ACTIVITIES: CPT

## 2020-08-05 PROCEDURE — 82948 REAGENT STRIP/BLOOD GLUCOSE: CPT

## 2020-08-05 PROCEDURE — 85027 COMPLETE CBC AUTOMATED: CPT

## 2020-08-05 PROCEDURE — 93010 ELECTROCARDIOGRAM REPORT: CPT | Performed by: INTERNAL MEDICINE

## 2020-08-05 PROCEDURE — 2060000000 HC ICU INTERMEDIATE R&B

## 2020-08-05 PROCEDURE — APPSS30 APP SPLIT SHARED TIME 16-30 MINUTES: Performed by: PHYSICIAN ASSISTANT

## 2020-08-05 PROCEDURE — 99232 SBSQ HOSP IP/OBS MODERATE 35: CPT | Performed by: NEUROLOGICAL SURGERY

## 2020-08-05 PROCEDURE — 94760 N-INVAS EAR/PLS OXIMETRY 1: CPT

## 2020-08-05 PROCEDURE — 99233 SBSQ HOSP IP/OBS HIGH 50: CPT | Performed by: SURGERY

## 2020-08-05 PROCEDURE — 6360000002 HC RX W HCPCS: Performed by: NURSE PRACTITIONER

## 2020-08-05 PROCEDURE — 6370000000 HC RX 637 (ALT 250 FOR IP): Performed by: NURSE PRACTITIONER

## 2020-08-05 PROCEDURE — APPSS60 APP SPLIT SHARED TIME 46-60 MINUTES: Performed by: PHYSICIAN ASSISTANT

## 2020-08-05 RX ADMIN — TRAMADOL HYDROCHLORIDE 50 MG: 50 TABLET, FILM COATED ORAL at 03:43

## 2020-08-05 RX ADMIN — BUMETANIDE 1 MG: 1 TABLET ORAL at 09:37

## 2020-08-05 RX ADMIN — OMEGA-3-ACID ETHYL ESTERS 2000 MG: 1 CAPSULE, LIQUID FILLED ORAL at 09:40

## 2020-08-05 RX ADMIN — DILTIAZEM HYDROCHLORIDE 180 MG: 180 CAPSULE, COATED, EXTENDED RELEASE ORAL at 09:39

## 2020-08-05 RX ADMIN — TRAMADOL HYDROCHLORIDE 25 MG: 50 TABLET, FILM COATED ORAL at 09:44

## 2020-08-05 RX ADMIN — PANTOPRAZOLE SODIUM 40 MG: 40 TABLET, DELAYED RELEASE ORAL at 06:23

## 2020-08-05 RX ADMIN — METOPROLOL TARTRATE 25 MG: 50 TABLET, FILM COATED ORAL at 09:37

## 2020-08-05 RX ADMIN — SODIUM CHLORIDE: 9 INJECTION, SOLUTION INTRAVENOUS at 22:27

## 2020-08-05 RX ADMIN — Medication 1 TABLET: at 09:36

## 2020-08-05 RX ADMIN — IOPAMIDOL 80 ML: 755 INJECTION, SOLUTION INTRAVENOUS at 00:40

## 2020-08-05 RX ADMIN — MORPHINE SULFATE 2 MG: 2 INJECTION, SOLUTION INTRAMUSCULAR; INTRAVENOUS at 12:27

## 2020-08-05 RX ADMIN — ROSUVASTATIN CALCIUM 20 MG: 20 TABLET, FILM COATED ORAL at 09:40

## 2020-08-05 RX ADMIN — METOPROLOL TARTRATE 25 MG: 50 TABLET, FILM COATED ORAL at 00:00

## 2020-08-05 RX ADMIN — ISOSORBIDE MONONITRATE 30 MG: 30 TABLET ORAL at 09:36

## 2020-08-05 RX ADMIN — LOSARTAN POTASSIUM 25 MG: 25 TABLET, FILM COATED ORAL at 09:36

## 2020-08-05 ASSESSMENT — PAIN DESCRIPTION - PROGRESSION
CLINICAL_PROGRESSION: GRADUALLY WORSENING
CLINICAL_PROGRESSION: GRADUALLY IMPROVING

## 2020-08-05 ASSESSMENT — PAIN SCALES - GENERAL
PAINLEVEL_OUTOF10: 8
PAINLEVEL_OUTOF10: 3
PAINLEVEL_OUTOF10: 8
PAINLEVEL_OUTOF10: 5
PAINLEVEL_OUTOF10: 6
PAINLEVEL_OUTOF10: 6

## 2020-08-05 ASSESSMENT — PAIN DESCRIPTION - DESCRIPTORS
DESCRIPTORS: ACHING
DESCRIPTORS: ACHING

## 2020-08-05 ASSESSMENT — PAIN DESCRIPTION - ONSET
ONSET: ON-GOING
ONSET: ON-GOING

## 2020-08-05 ASSESSMENT — ENCOUNTER SYMPTOMS
NAUSEA: 0
CHEST TIGHTNESS: 0
ABDOMINAL PAIN: 0
SHORTNESS OF BREATH: 0
ABDOMINAL DISTENTION: 0
BACK PAIN: 0

## 2020-08-05 ASSESSMENT — PAIN DESCRIPTION - LOCATION
LOCATION: NECK
LOCATION: NECK

## 2020-08-05 ASSESSMENT — PAIN DESCRIPTION - PAIN TYPE
TYPE: ACUTE PAIN
TYPE: ACUTE PAIN

## 2020-08-05 ASSESSMENT — PAIN DESCRIPTION - FREQUENCY
FREQUENCY: INTERMITTENT
FREQUENCY: INTERMITTENT

## 2020-08-05 ASSESSMENT — PAIN - FUNCTIONAL ASSESSMENT: PAIN_FUNCTIONAL_ASSESSMENT: PREVENTS OR INTERFERES SOME ACTIVE ACTIVITIES AND ADLS

## 2020-08-05 NOTE — PLAN OF CARE
Problem: Falls - Risk of:  Goal: Will remain free from falls  Description: Will remain free from falls  8/5/2020 1035 by Jhony Montero RN  Outcome: Met This Shift  Note: Call light in reach, bed in lowest position, and bed alarm activated. Education given on use of call light before ambulation and when in need of assistance. Patient expressed understanding. Hourly visual checks performed and charted. Toileting offered to patient. No falls this shift, at any time. Arm band and falling star in place. Will continue to monitor. Problem: Skin Integrity:  Goal: Will show no infection signs and symptoms  Description: Will show no infection signs and symptoms  8/5/2020 1035 by Jhony Montero RN  Outcome: Met This Shift  Note: No signs of new skin breakdown with each assessment. Skin remains warm, dry, intact. Mucous membranes pink & moist. Patient is able to turn self. Problem: Safety:  Goal: Free from accidental physical injury  Description: Free from accidental physical injury  8/5/2020 1035 by Jhony Montero RN  Outcome: Met This Shift     Problem: Pain:  Description: Pain management should include both nonpharmacologic and pharmacologic interventions. Goal: Pain level will decrease  Description: Pain level will decrease  8/5/2020 1035 by Jhony Montero RN  Outcome: Ongoing     Problem: Discharge Planning:  Goal: Patients continuum of care needs are met  Description: Patients continuum of care needs are met  8/5/2020 1035 by Jhony Montero RN  Outcome: Ongoing  Note: Rehab at [facility] vs home   Care plan reviewed with patient. Patient verbalize understanding of the plan of care and contribute to goal setting.

## 2020-08-05 NOTE — PROGRESS NOTES
[address] [address]  INPATIENT PHYSICAL THERAPY  EVALUATION  [facility] 4A - 4A-08/008-A    Time In: 1563  Time Out: 1055  Timed Code Treatment Minutes: 19 Minutes  Minutes: 27     Date: 2020  Patient Name: Rowdy Murry,  Gender:  female        MRN: [id number]  : 1929  (80 y.o.)      Referring Practitioner: Breana DAVIS  Diagnosis: fall, accidental, initial encounter  Additional Pertinent Hx: Per H/P 64: \"80 year old female presenting as a [facility] for treatment of injuries sustained in a fall over 24 hours ago. She reports that she was getting out of bed and fell, hitting her head on the floor. She denies loss of consciousness. She states that she crawled on the floor trying to get up but ended up hitting her medical alert button and help arrived. States that she had some neck pain after the fall, but that she expected that. However, over the course of the 24 hours after the fall, the neck pain got worse prompting her to request evaluation at the ER. She was found to have a C2 fracture and was subsequently transferred for further care. Upon her arrival here, she complained of abdominal pain and a CT scan of the abdomen was obtained. She denies any headache, nausea, vomiting, paresthesias, chest pain, shortness of breath, fever, chills, constipation, diarrhea, back pain. She has no neurological deficits at presentation. \" No surgical intervention at this time, c-collar worn at all time. Restrictions/Precautions:  Restrictions/Precautions: Surgical Protocols, Fall Risk  Required Braces or Orthoses  Cervical: c-collar  Position Activity Restriction  Spinal Precautions: No Bending, No Lifting, No Twisting    Subjective:  Chart Reviewed: Yes  Patient assessed for rehabilitation services?: Yes  Family / Caregiver Present: Yes(daughter)  Subjective: RN approved PT session.  Pt supine in bed upon entry, daughter present, and pt agreeable to PT interventions. Pt pleasant and willing to participate in activity. Post session, pt in bedside chair with all needs in reach. Told pt to call nurse when ready to get back into bed secondary to cervical pain and pressure. Chair alarm on, RN aware. General:  Overall Orientation Status: Within Functional Limits  Follows Commands: Within Functional Limits    Vision: Impaired  Vision Exceptions: Wears glasses at all times    Hearing: Within functional limits         Pain: 6-7/10: Pt reports cervical pain that increased with sitting upright and ambulating. Pt reported it was decreasing once sitting in chair after session. Social/Functional History:    Lives With: Alone  Type of Home: (independent living)  Home Layout: One level  Home Access: Level entry  Home Equipment: Rolling walker, 4 wheeled walker, Cane        ADL Assistance: Independent     Ambulation Assistance: Independent  Transfer Assistance: Independent          Additional Comments: Pt reported use of RW at all times for mobility PTA. Pt denied use of her 4WW secondary to it being too big. Pt states she was active and walking frequently in the facility to play cards however has been bound to room with minimal activity secondary to co-vid restrictions. Pt also mentioned about 8 falls in the past year or so secondary to LOB when walking backwards.     OBJECTIVE:  Range of Motion:  Right Lower Extremity: WFL  Left Lower Extremity: WFL    Strength:  Right Lower Extremity: WFL  Left Lower Extremity: WFL    Balance:  Dynamic Sitting Balance: Contact Guard Assistance, with increased time for completion, performed functional task with CGA for steadiness and safety  Static Standing Balance: Contact Guard Assistance, use of RW  Dynamic Standing Balance: Contact Guard Assistance, with verbal cues , with increased time for completion, use of RW, posterior lean noted when backing up to chair    Bed Mobility:  Rolling to Right: Minimal Assistance, with head of bed safety and independence with functional mobility for improved independence and quality of life. Treatment included education on precautions and safety with transfers, transfer and gait training. Cues provided to ensure safety with bed mobility, transfers, and ambulation. Assessment: Body structures, Functions, Activity limitations: Decreased functional mobility , Decreased endurance, Increased pain, Decreased posture  Assessment: Pt presents with a fall resulting in C2 fracture, no surgical intervention required at this time per neurology. C-collar in place, pt has significant increased pain in the neck and demonstrates a decrease from baseline with bed mobility, transfers, and ambulation and would benefit from continued skilled therapy until D/C and post admission. Prognosis: Good    REQUIRES PT FOLLOW UP: Yes    Discharge Recommendations:  Discharge Recommendations: [address]    Patient Education:  PT Education: Goals, PT Role, Plan of Care, Precautions, Transfer Training, Gait Training, Functional Mobility Training  Education provided on the wear and fit of c-collar, discussion that it must be worn at all times and only adjusted or removed by someone else while pt in lying supine. Discussion on spinal precautions including bed roll technique to maintain stability with bed mobility.      Equipment Recommendations:  Equipment Needed: No    Plan:  Times per week: 5-6xO  Times per day: Daily  Current Treatment Recommendations: Transfer Training, Endurance Training, Patient/Caregiver Education & Training, Gait Training, Home Exercise Program, Functional Mobility Training    Goals:  Patient goals : to return home  Short term goals  Time Frame for Short term goals: to discharge  Short term goal 1: rolling to and from supine with log roll precautions and S for ease of bed mobility  Short term goal 2: supine <> sit with log roll precautions and CGA to get in and out of bed  Short term goal 3: sit <> stand with S to improve functional mobility  Short term goal 4: amb 25 ft with RW and S to safely ambulate in room and in independent living safely  Long term goals  Time Frame for Long term goals : NA secondary to short ELOS    Following session, patient left in safe position with all fall risk precautions in place.

## 2020-08-05 NOTE — PROGRESS NOTES
Attending Note:     - Patient seen and examined in the floor in  conjunction with neurosurgery LYNDSAY James PA-C).  Discussed with patient, her nurse and rauma service as well.  All data and imaging reviewed by myself. I agree with examination assessment and plan as documented below. Please See my additional comments below for updated orders and plan.     -  This is a 27-year-old female who was transferred from outside hospital after she underwent cervical spine CT that showed C2 type 3 odontoid fracture. Patient underwent that that cervical spine CT because of history of a ground-level fall. There is no history of loss of consciousness or new weakness or numbness and that after that fall.      -During my evaluation to the patient:    -There is no acute event over the night.  -Patient stated that her neck pain is less than yesterday. she is awake alert oriented x3, this is 15/15, there is no focal neurological deficit, sensory:  grossly intact, pupils:  Equal and reactive (still there is no focal neurological deficit). -Patient underwent brain/neck CTA that showed:       1. No evidence of vascular injury in the neck. 2. Atherosclerosis of both carotid bulbs without hemodynamically significant stenosis      -Patient refused to obtain a cervical spine MRI because she is worried about her pacemaker.     -At this time patient recommendation and treatment plan from neurosurgical perspective:    · Keep the patient in c[facility]collar ([facility] at this time.   · Decision making:   Given the radiological features of patient fracture (no significant displacement, type III identified fracture and some features that suggest that this fracture perhaps an old fracture and in the regard, patient stated that she had other falls in the past before her last fall a couple of days ago), as well as patient age and the fact that patient does not want any surgical intervention (as she stated again today in front of her nurse Keyla Stack), I will recommend for the patient a conservative treatment option at this time in the form of c-collar. Patients can be started to be mobilized with physical therapy, and if she does well she can be discharged from neurosurgical perspective. · After patient is discharged she needs to follow-up with neurosurgery clinic after 1 month with a new cervical spine CT (as long as there is no deterioration in patient neurological status). · I discussed the treatment plan and the neurosurgical recommendation with patient in detail and the front of her nurse Jose Gaston). All questions and concerns were addressed and answered. · From this time on neurosurgery will see this patient only as needed as long as she is in the hospital.  Please Call me if you have any further questions or concern regarding this patient.  Niesha Rocha MD        Neurosurgery Progress Note    Patient:  Ajay Huang      Unit/Bed:4A-08/008-A    YOB: 1929    MRN: [id number]     Acct: [de-identified]     Admit date: 8/4/2020    Chief Complaint   Patient presents with   Lynda Ni Fall    Neck Injury     C2 FX       Patient Seen, Chart, Physician notes, Labs, Radiology studies reviewed. Subjective: He is resting comfortably today following admission through the emergency department for evaluation and treatment of mechanical injury suffered in a fall which include type III dens fracture. Pain is well controlled this morning and there were no acute changes recorded the patient's chart overnight. Past, Family, Social History unchanged from admission.     Diet:  DIET CLEAR LIQUID; Carb Control: 5 carb choices (75 gms)/meal    Medications:  Scheduled Meds:   sodium chloride flush  10 mL Intravenous 2 times per day    polyethylene glycol  17 g Oral Daily    insulin lispro  0-6 Units Subcutaneous TID WC    insulin lispro  0-3 Units Subcutaneous Nightly    pantoprazole  40 mg Oral QAM AC    folbee plus  1 tablet Oral 12.8 oz (68.4 kg)   08/21/13 158 lb 12.8 oz (72 kg)         CBC:   Recent Labs     08/04/20  1150 08/05/20  0345   WBC 9.8 8.9   HGB 11.0* 10.5*   * 125*     BMP:    Recent Labs     08/05/20  0345      K 4.1      CO2 19*   BUN 30*   CREATININE 1.2   GLUCOSE 92     Calcium:  Recent Labs     08/05/20  0345   CALCIUM 8.1*     Magnesium:No results for input(s): MG in the last 72 hours. Glucose:  Recent Labs     08/04/20  1805 08/04/20  1940 08/05/20  0645   POCGLU 103 118* 92     HgbA1C: No results for input(s): LABA1C in the last 72 hours. Lipids: No results for input(s): CHOL, TRIG, HDL, LDLCALC in the last 72 hours. Invalid input(s): LDL    Radiology reports as per the Radiologist  Radiology: Ct Abdomen Pelvis Wo Contrast Additional Contrast? None    Result Date: 8/4/2020  PROCEDURE: CT ABDOMEN PELVIS WO CONTRAST CLINICAL INFORMATION: L pelvic pain, subjective abd distension . COMPARISON: No prior study. TECHNIQUE: 2-D multiplanar noncontrast images of the abdomen and pelvis All CT scans at this facility use dose modulation, iterative reconstruction, and/or weight-based dosing when appropriate to reduce radiation dose to as low as reasonably achievable. FINDINGS: Limitations: Solid organ and hollow viscera evaluation limited without contrast Lung bases Cardiomegaly. AICD present. Lung bases are clear. 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 There is a small gallstone present. Spleen is normal. Pancreas is atrophic. The adrenals are normal. Kidneys have perinephric stranding bilaterally. There are no renal calculi. No hydronephrosis. There is a lobulated contour to the left kidney at the interpolar portion there is a rounded isodense masslike portion has cystic Hounsfield units. Smaller adjacent rounded lesion has cystic Hounsfield units. Aorta is moderately calcific.  Pelvis there is no evidence of artery. Right common carotid artery/ICA: The right common carotid artery is within acceptable limits. There is some calcified atherosclerosis at the level the right carotid bulb. There is no associated hemodynamically significant stenosis. Using NASCET criteria and the distal right internal carotid artery as the reference, there is a 40% stenosis at the right internal carotid artery origin. There is no distal stenosis. Left common carotid artery/ICA: The left common carotid artery is within acceptable limits. There is calcified atherosclerosis of the left carotid bulb. There is no associated hemodynamically significant stenosis. The proximal left internal carotid artery is tortuous and redundant. There is mild stenosis in the proximal aspect of the tortuosity. There is no distal stenosis. Vertebral arteries: On the right, there is mild stenosis at the origin of the right vertebral artery. There is some tortuosity. There is no vascular injury. There is no other stenosis. On the left, there is some calcified atherosclerosis at the origin the left vertebral artery. There is no associated stenosis. The remainder the left vertebral artery is normal. There is no vascular injury. The vertebral arteries are codominant. Both vertebral arteries are carefully assessed at the C2 level. No vascular  injury is noted. Intracranial cerebral circulation: There is calcified atherosclerosis of the cavernous segments of the internal carotid arteries bilaterally. No severe stenosis is noted. Axial source data: The patient has a pacemaker. There are old fractures of posterior upper left ribs. There are degenerative changes of the cervical spine. There are fractures of the C2 vertebral body. The right aspect of the sphenoid sinus is opacified. The other paranasal sinuses are normally aerated. 1. No evidence of vascular injury in the neck.  2. Atherosclerosis of both carotid bulbs without hemodynamically significant stenosis **This report has been created using voice recognition software. It may contain minor errors which are inherent in voice recognition technology. ** Final report electronically signed by Dr. Malika Tolbert on 8/5/2020 7:13 AM    Xr Chest Portable    Result Date: 8/4/2020  PROCEDURE: XR CHEST PORTABLE CLINICAL INFORMATION: Fall technologist notes state C2 fracture. COMPARISON: No prior study. TECHNIQUE: AP portable chest radiograph performed. FINDINGS: POSTSURGICAL CHANGES: None. LINES/TUBES/MECHANICAL DEVICES: 1. There is a left subclavian pacemaker with leads overlying the right atrium and the right ventricle. TRACHEA/HEART/MEDIASTINUM/HILUM: 1. The cardiac silhouette is upper limits of normal size. 2. There is atheromatous calcification along the thoracic aorta. LUNG MEDINA: Normal. OTHER: None. PNEUMOTHORAX:  None. OSSEOUS STRUCTURES: 1. No acute osseous abnormality. 2. The bony structures are osteopenic. 3. No obvious rib fracture is identified. 4. There is mild levoscoliosis of the thoracic spine. 1. There is no acute cardiopulmonary process. 2. If there is clinical concern for rib fractures, then a CT examination of the chest would be recommended. **This report has been created using voice recognition software. It may contain minor errors which are inherent in voice recognition technology. ** Final report electronically signed by Dr. Archana Aponte on 8/4/2020 8:16 AM    Ct Interpretation Of Outside Images    Result Date: 8/4/2020  PROCEDURE: CT INTERPRETATION OF OUTSIDE IMAGES CLINICAL INFORMATION: trauma. COMPARISON: No prior study. TECHNIQUE: Noncontrast cervical spine CT was performed at [facility] on 8/4/2020 at 4:45 AM and submitted for interpretation. FINDINGS: Vertebrae: There is straightening of the cervical spine which may be due to patient positioning, pain, or muscle spasm.  There is a fracture through the upper C2 vertebral body consistent with a type III dens fracture, extending into the left C2 lateral mass and at the junction of the base of the dens and the lateral mass on the right. The left lateral mass fragments are mildly depressed/displaced. The fracture on the left extends to multiple walls of the the left C2 foramen transversarium. There is displacement of fracture fragments resulting in a moderate to severe stenosis of the foramen. . Recommend neck CTA to evaluate for possible left vertebral artery injury. There is no subluxation. There is multilevel endplate spondylosis, uncovertebral joint DJD, and facet joint DJD. Disc spaces/neural foramina: There is multilevel degenerative disc disease, most severe between C3-4 and C6-7. At C3-4 there is a disc osteophyte complex with mild spinal stenosis. At C4-5 there is a disc osteophyte complex with mild to moderate spinal stenosis and cord impingement. At C5-6 there is a mild disc osteophyte complex without significant spinal stenosis or cord impingement. There are multilevel bilateral neural foraminal stenoses. Spinal canal: There is no obvious epidural hematoma. Soft tissues: Prevertebral soft tissues are normal. There are lower right neck surgical clips adjacent to the right thyroid lobe. There are bilateral carotid artery calcified plaques. Imaged lungs: The imaged lung apices are clear. Sinuses: The imaged sinuses are clear. . Mastoids: The imaged mastoid air cells are clear. Type III dens fracture through the upper C2 vertebral body extending into the left lateral mass with displacement of fragments into the left C2 foramen transversarium result in a moderate to severe stenosis of the foramen. Recommend neck CTA to evaluate  for possible left vertebral artery injury. Multilevel degenerative disc disease and DJD. Multilevel disc osteophyte complexes with varying severity spinal stenosis and cord impingement at C4-5. **This report has been created using voice recognition software.  It may contain minor errors which are inherent in voice recognition continues to recommend conservative treatment at this time. Neurosurgery to follow.     Electronically signed by Sergio Ngo PA-C on 8/5/2020 at 8:49 AM

## 2020-08-05 NOTE — PROGRESS NOTES
kidney disease, stage III (moderate) (HCC)     Constipation     Depression     Diabetes mellitus ([address]     Diverticulitis     Falls     GERD (gastroesophageal reflux disease)     Glaucoma     Hyperlipidemia     Hypertension     Migraine     Rheumatic fever        SUBJECTIVE:  Upon arrival, pt with OVERT coughing episode s/p intake of yellow jello with pt stating, \"that went down the wrong pipe\"; proceeded to f/u with consumption of thin H20 via straw with continual coughing to ensue, removal of foods/liquids henceforth completed by ST to assist with recovery from coughing occurrences. Pt denies hx of dysphagia prior to admission, reporting a regular diet with thin liquids at [facility]. No family at bedside. Dr. Natalia Parker with rounding during assessment, reporting \"conservative\" treatment at this time as r/t C2 fracture; [place] collar in place. OBJECTIVE:    Pain:  No pain reported. Current Diet: Clear liquid, thin viscosity level  *RN Liz reporting approval to proceed for trial of alternate textures within BSE    Respiratory Status:  Independent    Behavioral Observation:  Alert and Oriented    Oral Mechanism Evaluation:      Facial / Labial WFL    Lingual WFL    Dentition WFL    Velum WFL    Vocal Quality WFL    Sensation WFL    Cough WFL      Patient Evaluated Using: Thin H20 via straw; applesauce; hard/coarse solids    Oral Phase:  Impaired:  Impaired Mastication    Pharyngeal Phase: Impaired:  Delayed Swallow, Decreased Hyolaryngeal Elevation, Audible Swallow and Suspected Pharyngeal Residue    Signs and Symptoms of Laryngeal Penetration/Aspiration: Throat Clear, Immediate Cough and Wet Vocal Quality    Impresssions: Pt presents with at least mild oral dysphagia, severe pharyngeal dysphagia based on findings outlined above.  Concerns at this time for progressed severity of pharyngeal dysphagia d/t C2 fracture with likelihood for superimposed edema at site of injury, henceforth, potentially impacting pharyngeal phase of swallow function. Oral phase compounded given placement of [place] collar, in which full mandible excursion not able to be achieved during attempts to masticate hard/coarse solids; notable improvements for oral phase abilities during consumption of applesauce offerings. Swallow initiation mildly delayed with reduced hyolaryngeal elevation upon tactile palpitation, to be anticipated to some degree given pt's stated age (would need formal imaging to confirm). Thin H20 via straw x3 trials consumed with consistent immediate cough produced, wet vocal quality to follow. Applesauce offerings with high prevalence for throat clearing, evidenced additionally as well with lorena cracker trials completed. Audible swallow detected throughout as well as presentation for production of spontaneous, multiple swallows (x2-3 per bolus), concerning for reduced control of boli' leading to premature pharyngeal entry and residuals in the pharynx. Recommend STRICT NPO diet level, formal instrumentation via MBS warranted to further evaluate pharyngeal integrity. *following completion of BSE, transitioned to skilled service provision via education, in which extensive instruction provided re: anatomy/physiology of swallow mechanism, rationale for NPO diet level, and recommendations for further diagnostic testing of swallow mechanism. Pt verbalized, \"I'm 80years old, I don't know how much further testing I want to have done\". Concerns conveyed re: potential implications of aspiration events ([place]um[place], recurrent and prolonged hospitalization, trajectory for medical decline) should PO intake occur at this time, as well as potential adverse findings that could be discovered within MBS that could lead to potential need of thickened viscosity level of fluids OR potential continuation of NPO diet level with enteral means to nutrition/hydration measures.  Code status at this time is listed as FULL CODE, indicative for potential continuation of aggressive medical measures. Pt reported that she plans to \"discuss\" with family members re: further POC as it r/t swallow function. Recommendations maintain for NPO diet level, altering of POC dependent upon pt and family wishes  **palliative care consult requested    RECOMMENDATIONS/ASSESSMENT:   Modified Barium Swallow:  MBS is indicated to further assess pharyngeal integrity to r/o dsyfunction. Will complete as indicated with priority f/u on 8/6/2020. Diet Recommendations:  NPO  Strategies:  Recommend NPO and Strategies pending MBS completion   Rehabilitation Potential: fair    EDUCATION:  Learner: Patient  Education:  Reviewed results and recommendations of this evaluation, Reviewed diet and strategies, Reviewed signs, symptoms and risks of aspiration, Reviewed ST goals and Plan of Care, Reviewed recommendations for follow-up, Education Related to Potential Risks and Complications Due to Impairment/Illness/Injury and Education Related to Prevention of Recurrence of Impairment/Illness/Injury  Evaluation of Education: Verbalizes understanding, Needs further instruction and Family not present    PLAN:  Speech Therapy evaluation to assess speech, language, cognition and/or voice and Recommendations pending MBS    PATIENT GOAL:    Did not state. Will further assess during treatment. SHORT TERM GOALS:  Short-term Goals  Timeframe for Short-term Goals: 2 weeks  Goal 1: Monitor pt and family wishes with considerations for completion of formal instrumentation via MBS vs completion of repeat BSE to provide futher POC development r/t dysphagia needs. Goal 2: Staff will exhibit return demonstration for assisting with completion of comprehensive oral care to maximize oral integrity and to reduce potential bacteria colonization. Goal 3: Complete full cognitive linguistic evaluation s/p fall to ascertain need for further goal per POC development.     LONG TERM GOALS:  No LTG established given domonique Cm M.A., [address] [place] [address]

## 2020-08-05 NOTE — PLAN OF CARE
injury  Outcome: Ongoing  Note: Patient free from accidental physical injury this shift. All fall precautions followed. Problem: Safety:  Goal: Free from intentional harm  Description: Free from intentional harm  Outcome: Ongoing     Problem: Discharge Planning:  Goal: Patients continuum of care needs are met  Description: Patients continuum of care needs are met  Note: Patient educated that discharge plan is still in progress. Patient from [place]. Care plan reviewed with patient. Patient verbalizes understanding of the plan of care and contributes to goal setting.

## 2020-08-05 NOTE — CARE COORDINATION
8/5/20, 12:03 PM EDT  DISCHARGE PLANNING EVALUATION:    Luis Alberto Price       Admitted from: [facility] 8/4/2020/ Debbie Hodges day: 1   Location: 4A-08/008-A Reason for admit: Fall, accidental, initial encounter [W19. XXXA] Status: IP  Admit order signed?: yes  PMH:  has a past medical history of Anemia, Arthritis, Atherosclerotic heart disease, CHF (congestive heart failure) ([address] Chronic kidney disease, stage III (moderate) ([address] Constipation, Depression, Diabetes mellitus ([address] Diverticulitis, Falls, GERD (gastroesophageal reflux disease), Glaucoma, Hyperlipidemia, Hypertension, Migraine, and Rheumatic fever. Procedure: none  Pertinent abnormal Imaging:CXR   Impression:          1. There is no acute cardiopulmonary process. 2. If there is clinical concern for rib fractures, then a CT examination of the chest would be recommended. CTA Neck WO Contrast    Impression:             1. No evidence of vascular injury in the neck. 2. Atherosclerosis of both carotid bulbs without hemodynamically significant stenosis          Medications:  Scheduled Meds:   sodium chloride flush  10 mL Intravenous 2 times per day    polyethylene glycol  17 g Oral Daily    insulin lispro  0-6 Units Subcutaneous TID WC    insulin lispro  0-3 Units Subcutaneous Nightly    pantoprazole  40 mg Oral QAM AC    folbee plus  1 tablet Oral Daily    bumetanide  1 mg Oral Once per day on Mon Wed Fri    dilTIAZem  180 mg Oral Daily    isosorbide mononitrate  30 mg Oral Daily    losartan  25 mg Oral Daily    metoprolol tartrate  25 mg Oral BID    omega-3 acid ethyl esters  2,000 mg Oral Daily    rosuvastatin  20 mg Oral Daily     Continuous Infusions:   sodium chloride 50 mL/hr at 08/04/20 1354    dextrose        Pertinent Info/Orders/Treatment Plan:  Urine (+), IV fluids, pain control, diabetes management, PT/OT, telemetry, cervical collar, Neurosurgery consult, Cardiology consult.   Diet: Diet NPO Effective Now Smoking status:  reports that she has never smoked. She has never used smokeless tobacco.   PCP: Reshma Schwarz MD  Readmission 30 days or less: no  Readmission Risk Score: 17%    Discharge Planning Evaluation  Current Residence:  Assisted living  Living Arrangements:  [place]:  Family Members, Children  Current Services PTA:     Potential Assistance Needed:  N/A  Potential Assistance Purchasing Medications:  No  Does patient want to participate in local refill/ meds to beds program?  No  Type of Home Care Services:  None  Patient expects to be discharged to:  return to [facility]  Expected Discharge date:  08/07/20  Follow Up Appointment: Best Day/ Time: Tuesday AM    Patient Goals/Plan/Treatment Preferences: Met with Brie. She currently lives at [facility], [facility] [address] She uses a walker. States she was fairly independent prior to admission. Plan is to return home at discharge. Will follow PT/OT recommendations for possible additional needs and or services. S following. Transportation/Food Security/Housekeeping Addressed:  No issues identified.     Evaluation: yes

## 2020-08-05 NOTE — PROGRESS NOTES
[facility] [address] Caron [place]  Daily Progress Note    Pt Name: Maria Del Carmen Negron Record Number: [id number]  Date of Birth 12/16/1929   Today's Date: 8/5/2020    HD: # 1    CC: \"Hello there\"    ASSESSMENT  1. Active Hospital Problems    Diagnosis Date Noted    Fall, accidental, initial encounter [W19. XXXA] 08/04/2020    Closed type III fracture of odontoid process ([address] [S12.120A] 08/04/2020    Acute cystitis without hematuria [N30.00] 08/04/2020         PLAN  Admit to 4A     Type III dens fracture              - Consult Neurosurgery              - Place [place] collar              - C-spine precautions - maintain C-collar              - Neuro checks              - Pain management              - CTA to R/O vertebral artery injury showed no vascular injury              - Patient refusing MRI due to concern for pacemaker   - Dr. Marina Katz NS believes dens fracture may be non-acute.  Recommends conservative treatment with [place] collar, follow up in 1 month for CT neck and NS outpatient visit     UTI              - Fosfomycin x1 dose     Abdominal pain              - Possible blood vs radiopaque medication in cecum              - Daily CBC stable     CAD, hyperlipidemia, Hx of pacemaker for SSS              - Resume home medications              - Consult Cardiology cancelled, required imaging refused by patient    Pain management              - Ultram or Morphine PRN     General diet, soft dysphagia diet per patient and daughter  IVF hydration  Repeat labs in AM  Prophylaxis: SCDs, IS, C&DB, Pepcid, Miralax  PT, OT and SLP to evaluate and treat   consult - pt will need rehab side of [place]  Discharge dispo pending clinical course              - From [place] - will need continued rehab       SUBJECTIVE  Mrs. Maryan Lui is a 79 Y/O female continuing to present at [address] [place] [address] on 4A following a fall with a past medical history of GERD, DM, arthritis, CHF, esophageal stricture, and other comorbidities. Patient reports neck and chronic hip pain. Patient denies chest pain, shortness of breath, cough, headache, dizziness, lightheadedness, numbness, paraesthesias, weakness, chills, fevers, abdominal pain, nausea, vomiting, diarrhea, blood in stool, neck pain, or back pain. Nursing staff states patient refusing MRI and swallow eval; neurosurgery reported dense fracture may be chronic so patient may be managed conervatively. Staff also reports patient and daughter indicate patient is a [facility]; patient code status changed to limited NO x4. After refusing swallow eval, patient desires to eat, patient daughter says patient is on a soft food diet chronically, dysphagia diet started. Plan to discharge home with indications from PT/OT when medically stable. Hemoglobin stable at 10.5, WBC stable at 8.9, no electrolyte abnormality, CTA images reviewed, vital signs show mild HTN on exam.       Wt Readings from Last 3 Encounters:   08/05/20 150 lb 12.8 oz (68.4 kg)   08/21/13 158 lb 12.8 oz (72 kg)     Temp Readings from Last 3 Encounters:   08/05/20 98.2 °F (36.8 °C) (Oral)     BP Readings from Last 3 Encounters:   08/05/20 (!) 133/58   08/21/13 130/70     Pulse Readings from Last 3 Encounters:   08/05/20 64       24 HR INTAKE/OUTPUT :     Intake/Output Summary (Last 24 hours) at 8/5/2020 0818  Last data filed at 8/5/2020 0702  Gross per 24 hour   Intake 400 ml   Output 1000 ml   Net -600 ml     DIET CLEAR LIQUID; Carb Control: 5 carb choices (75 gms)/meal    OBJECTIVE  CURRENT VITALS BP (!) 133/58   Pulse 64   Temp 98.2 °F (36.8 °C) (Oral)   Resp 16   Ht 5' 5\" (1.651 m)   Wt 150 lb 12.8 oz (68.4 kg)   SpO2 96%   BMI 25.09 kg/m²      GENERAL: Presents sitting upright inchair, A&Ox4 to person, place, time, and events; In no acute distress and well nourished. SKIN: Pink, warm and dry. EYES: No apparent trauma, discharge, or hematoma bilaterally.  , PERRL at 3mm,   CARDIO: No visible chest wall deformity. No heaves or lifts. Strong/regular S1/S2 rate and rhythm. No rubs murmurs, or gallops. 2+ radial and dorsalis pedal pulses. Capillary refill <2 sec. No extremity edema noted. PULMONARY:  Trachea midline, no audible wheezing, increased respiratory effort, accessory muscle use, or asymmetrical chest wall movement. Lung sounds are clear and audible to ascultation in superior and inferior fields. No wheezing, crackles, or rhonchi. ABDOMEN: Abdomen is non distended. Bowel sounds present in all four quadrants. Soft and non tender to light and deep palpation in all quadrants. NEURO: Follows commands, reasoning intact, recalls recent events. PMS intact, moves limbs freely. MSK: Extremities intact and present. No  new bruising, swelling, deformity, discoloration or bleeding.  strength 5/5 and equal bilaterally. LABS  CBC :   Recent Labs     08/04/20  1150 08/05/20  0345   WBC 9.8 8.9   HGB 11.0* 10.5*   HCT 37.4 34.7*   MCV 96.6 97.5   * 125*     BMP:   Recent Labs     08/05/20  0345      K 4.1      CO2 19*   BUN 30*   CREATININE 1.2     COAGS:   Recent Labs     08/05/20  0345   PROT 6.0*     Pancreas/HFP:  No results for input(s): LIPASE, AMYLASE in the last 72 hours. Recent Labs     08/05/20  0345   AST 13   ALT 14   BILITOT 0.5   ALKPHOS 72           Electronically signed by David Garcia PA-C on 8/5/2020 at 8:18 AM   Patient seen and evaluated independently this a.m. Care coordinated with Andre Cha PA-C. Nonoperative management with cervical collar. Continue therapies. Patient swallowing well no evidence of aspiration. CTA reviewed. No vascular injury. Therapy evaluations and patient to return to rehab side of her care facility when accepted. Continue supportive care. Agree as documented. All new data imaging reviewed and patient examined.

## 2020-08-05 NOTE — CARE COORDINATION
DISCHARGE/PLANNING EVALUATION  8/5/20, 3:48 PM EDT    Reason for Referral: From [facility], may need rehab. Mental Status: Answered questions appropriately. Decision Making: Able to make her own decisions, but allows her daughter to help. Family/Social/Home Environment: Lives in an independent living apartment at [facility]. She has 6 children. She told [employer] that 4 of them have been very supportive, the other two are not. Current Services including food security, transportation and housekeeping: Needs are met with help from family. Current Equipment:rolling walker, high toilet with grab bars, shower bench, grab bars in the shower. Payment Source:[employer]  Concerns or Barriers to Discharge: None  Post acute provider list with quality measures, geographic area and applicable managed care information provided. Questions regarding selection process answered:Not needed at this time. Teach Back Method used with Brie and her daughter Daisy Sullivan regarding care plan and discharge planning. Patient and her daughter Daisy Sullivan verbalized understanding of the plan of care and contribute to goal setting. Patient goals, treatment preferences and discharge plan: Patient and her daughter are agreeable to her going to [facility] at discharge for rehab. She will be a [employer] precert. [facility] made referral to [address] [place] [address] at [facility]. Electronically signed by RODRIGO Gottlieb on 8/5/2020 at 3:48 PM

## 2020-08-05 NOTE — PROGRESS NOTES
[address] [facility] THERAP[facility] MISSED TREATMENT NOTE  [facility] NEUROSCIENCES 4A  4A-08/008-A      Date: 2020  Patient Name: Alexandro Ba        CSN: [id number]   : 1929  (80 y.o.)  Gender: female                REASON FOR MISSED TREATMENT: Attempt x 1, Pt fatigue & painful from PT session, nurse request wait a little. Attempt x 2, Pt was just returned to supine. Will check back 2020.

## 2020-08-05 NOTE — FLOWSHEET NOTE
Per daughter in law and patient she does not want full code. No CPR, No shock, No medication and no intubation. Also states that patient does not want modified barium swallow and would like a diet. Per family she has had clearing issues for awhile and is not wanting any further testing for this. After watching patient swallow family states this is her baseline. Daughter in law not concerned. Also patient refusing MRI patient does not want to have pacemaker shut off at this time. Dr. Marco Jordan aware and ok with not wanting to have mri done. Dr. Marco Jordan wanting 1 month follow up and CT of head without contrast prior to appoint.

## 2020-08-06 LAB
ERYTHROCYTE [DISTWIDTH] IN BLOOD BY AUTOMATED COUNT: 13.8 % (ref 11.5–14.5)
ERYTHROCYTE [DISTWIDTH] IN BLOOD BY AUTOMATED COUNT: 49.2 FL (ref 35–45)
GLUCOSE BLD-MCNC: 112 MG/DL (ref 70–108)
GLUCOSE BLD-MCNC: 191 MG/DL (ref 70–108)
GLUCOSE BLD-MCNC: 208 MG/DL (ref 70–108)
GLUCOSE BLD-MCNC: 227 MG/DL (ref 70–108)
GLUCOSE BLD-MCNC: 69 MG/DL (ref 70–108)
HCT VFR BLD CALC: 34.3 % (ref 37–47)
HEMOGLOBIN: 10.5 GM/DL (ref 12–16)
MCH RBC QN AUTO: 29.5 PG (ref 26–33)
MCHC RBC AUTO-ENTMCNC: 30.6 GM/DL (ref 32.2–35.5)
MCV RBC AUTO: 96.3 FL (ref 81–99)
ORGANISM: ABNORMAL
PLATELET # BLD: 106 THOU/MM3 (ref 130–400)
PMV BLD AUTO: 10.2 FL (ref 9.4–12.4)
RBC # BLD: 3.56 MILL/MM3 (ref 4.2–5.4)
URINE CULTURE REFLEX: ABNORMAL
WBC # BLD: 8 THOU/MM3 (ref 4.8–10.8)

## 2020-08-06 PROCEDURE — 2060000000 HC ICU INTERMEDIATE R&B

## 2020-08-06 PROCEDURE — 36415 COLL VENOUS BLD VENIPUNCTURE: CPT

## 2020-08-06 PROCEDURE — 97116 GAIT TRAINING THERAPY: CPT

## 2020-08-06 PROCEDURE — APPSS60 APP SPLIT SHARED TIME 46-60 MINUTES: Performed by: NURSE PRACTITIONER

## 2020-08-06 PROCEDURE — 82948 REAGENT STRIP/BLOOD GLUCOSE: CPT

## 2020-08-06 PROCEDURE — 97530 THERAPEUTIC ACTIVITIES: CPT

## 2020-08-06 PROCEDURE — G0378 HOSPITAL OBSERVATION PER HR: HCPCS

## 2020-08-06 PROCEDURE — 2580000003 HC RX 258: Performed by: NURSE PRACTITIONER

## 2020-08-06 PROCEDURE — 2700000000 HC OXYGEN THERAPY PER DAY

## 2020-08-06 PROCEDURE — 51798 US URINE CAPACITY MEASURE: CPT

## 2020-08-06 PROCEDURE — 6370000000 HC RX 637 (ALT 250 FOR IP): Performed by: NURSE PRACTITIONER

## 2020-08-06 PROCEDURE — 85027 COMPLETE CBC AUTOMATED: CPT

## 2020-08-06 PROCEDURE — 99232 SBSQ HOSP IP/OBS MODERATE 35: CPT | Performed by: SURGERY

## 2020-08-06 PROCEDURE — 94760 N-INVAS EAR/PLS OXIMETRY 1: CPT

## 2020-08-06 PROCEDURE — 97166 OT EVAL MOD COMPLEX 45 MIN: CPT

## 2020-08-06 RX ADMIN — Medication 1 TABLET: at 09:00

## 2020-08-06 RX ADMIN — METOPROLOL TARTRATE 25 MG: 50 TABLET, FILM COATED ORAL at 22:07

## 2020-08-06 RX ADMIN — TRAMADOL HYDROCHLORIDE 50 MG: 50 TABLET, FILM COATED ORAL at 19:16

## 2020-08-06 RX ADMIN — SODIUM CHLORIDE, PRESERVATIVE FREE 10 ML: 5 INJECTION INTRAVENOUS at 19:48

## 2020-08-06 RX ADMIN — POLYETHYLENE GLYCOL 3350 17 G: 17 POWDER, FOR SOLUTION ORAL at 09:08

## 2020-08-06 RX ADMIN — ROSUVASTATIN CALCIUM 20 MG: 20 TABLET, FILM COATED ORAL at 09:00

## 2020-08-06 RX ADMIN — OMEGA-3-ACID ETHYL ESTERS 2000 MG: 1 CAPSULE, LIQUID FILLED ORAL at 09:07

## 2020-08-06 RX ADMIN — INSULIN LISPRO 1 UNITS: 100 INJECTION, SOLUTION INTRAVENOUS; SUBCUTANEOUS at 22:03

## 2020-08-06 RX ADMIN — TRAMADOL HYDROCHLORIDE 50 MG: 50 TABLET, FILM COATED ORAL at 12:20

## 2020-08-06 RX ADMIN — ISOSORBIDE MONONITRATE 30 MG: 30 TABLET ORAL at 09:00

## 2020-08-06 RX ADMIN — METOPROLOL TARTRATE 25 MG: 50 TABLET, FILM COATED ORAL at 09:00

## 2020-08-06 RX ADMIN — PANTOPRAZOLE SODIUM 40 MG: 40 TABLET, DELAYED RELEASE ORAL at 06:02

## 2020-08-06 RX ADMIN — TRAMADOL HYDROCHLORIDE 50 MG: 50 TABLET, FILM COATED ORAL at 06:02

## 2020-08-06 RX ADMIN — DILTIAZEM HYDROCHLORIDE 180 MG: 180 CAPSULE, COATED, EXTENDED RELEASE ORAL at 09:00

## 2020-08-06 RX ADMIN — LOSARTAN POTASSIUM 25 MG: 25 TABLET, FILM COATED ORAL at 09:00

## 2020-08-06 ASSESSMENT — PAIN SCALES - GENERAL
PAINLEVEL_OUTOF10: 0
PAINLEVEL_OUTOF10: 4
PAINLEVEL_OUTOF10: 6
PAINLEVEL_OUTOF10: 0
PAINLEVEL_OUTOF10: 7
PAINLEVEL_OUTOF10: 7

## 2020-08-06 ASSESSMENT — PAIN DESCRIPTION - LOCATION: LOCATION: HEAD

## 2020-08-06 ASSESSMENT — PAIN DESCRIPTION - PAIN TYPE
TYPE: ACUTE PAIN
TYPE: ACUTE PAIN

## 2020-08-06 ASSESSMENT — PAIN SCALES - WONG BAKER: WONGBAKER_NUMERICALRESPONSE: 8

## 2020-08-06 NOTE — FLOWSHEET NOTE
08/06/20 0100   Provider Notification   Reason for Communication Evaluate   Provider Name Taina Santiago Semaj   Provider Notification Advance Practice Clinician (CNS, NP, CNM, CRNA, PA)   Method of Communication Secure Message   Response Waiting for response   Notification Time 0032   Informed Taina Cha of lack of urine output since 1606 on 8/5/2020 when caruso catheter was removed. Bladder scan volume 432. Waiting for response.

## 2020-08-06 NOTE — PLAN OF CARE
Problem: Safety:  Goal: Free from intentional harm  Description: Free from intentional harm  Outcome: Met This Shift  Note: Patient shows no threat to self or others at this time. Problem: Falls - Risk of:  Goal: Will remain free from falls  Description: Will remain free from falls  Outcome: Ongoing  Note: Patient remained free from falls this shift. Bed is in low position with alarm on and siderails up x2. Patient ambulates with two assist. Education given on use of call light and patient voiced understanding. Patient uses call light appropriately. Call light and beside table within reach. Arm band and falling star in place. Problem: Falls - Risk of:  Goal: Absence of physical injury  Description: Absence of physical injury  Outcome: Ongoing  Note: Patient remains free from falls and shows no evidence of new skin breakdown. Problem: Skin Integrity:  Goal: Will show no infection signs and symptoms  Description: Will show no infection signs and symptoms  Outcome: Ongoing  Note: Patient afebrile. No other signs or symptoms of infection present at this time. Will continue to monitor and perform caruso care to prevent CAUTI. Problem: Skin Integrity:  Goal: Absence of new skin breakdown  Description: Absence of new skin breakdown  Outcome: Ongoing  Note: Patient turned every two hours. Patient remains free from new skin breakdown this shift. Problem: Pain:  Goal: Pain level will decrease  Description: Pain level will decrease  Outcome: Ongoing  Note: Patient denies pain this shift. Problem: Pain:  Goal: Control of acute pain  Description: Control of acute pain  Outcome: Ongoing  Note: Patient denies pain this shift. Problem: Safety:  Goal: Free from accidental physical injury  Description: Free from accidental physical injury  Outcome: Ongoing  Note: Patient educated on fall precautions. Patient compliant.       Problem: Discharge Planning:  Goal: Patients continuum of care needs are met  Description: Patients continuum of care needs are met  8/6/2020 0322 by Arnaldo Chambers RN  Outcome: Ongoing  Note: Patients continuum of care needs are met at this time. Problem: Urinary Elimination:  Goal: Complications related to the disease process, condition or treatment will be avoided or minimized  Description: Complications related to the disease process, condition or treatment will be avoided or minimized  Outcome: Ongoing  Note: Patient has not voided since 1606 on 8/5/2020. Patient ordered an indwelling catheter. Good urine return with insertion. Sterile technique to prevent CAUTI. Care plan reviewed with patient  Patient verbalized understanding of the plan of care and contributed to goal setting.

## 2020-08-06 NOTE — PROGRESS NOTES
Geovanna Sawant  Daily Progress Note    Pt Name: Maria Del Carmen Negron Record Number: [id number]  Date of Birth 12/16/1929   Today's Date: 8/6/2020    HD: # 2    CC: \"Hello there\"    ASSESSMENT  1. Active Hospital Problems    Diagnosis Date Noted    Fall, accidental, initial encounter [W19. XXXA] 08/04/2020    Closed type III fracture of odontoid process ([address] [S12.120A] 08/04/2020    Acute cystitis without hematuria [N30.00] 08/04/2020         PLAN  Admit to 4A     Type III dens fracture              - Neurosurgery managing non operatively               - Maintain [place] Ekron collar              - C-spine precautions              - Neuro checks              - Pain management              - CTA to R/O vertebral artery injury showed no vascular injury              - Patient refusing MRI due to concern for pacemaker   - Dr. Sam Moses NS believes dens fracture may be non-acute. Recommends conservative treatment with Aspen collar, follow up in 1 month for CT neck and NS outpatient visit     UTI              - Fosfomycin x1 dose     Abdominal pain              - Possible blood vs radiopaque medication in cecum              - Daily CBC stable     CAD, hyperlipidemia, Hx of pacemaker for SSS              - Resume home medications              - Consult Cardiology cancelled, required imaging refused by patient    Pain management              - Ultram or Morphine PRN     General diet, soft dysphagia diet per patient and daughter  Stop IVF hydration  Prophylaxis: SCDs, IS, C&DB, Pepcid, Miralax  PT, OT continue to treat as warranted   consult - pt will need rehab side of [address] [place] [address]  Discharge dispo pending clinical course              - From [place] - pre cert started for [facility]     vAtar Mccann continues on 4A. Patient reports neck and chronic hip pain.  Patient denies chest pain, shortness of breath, cough, headache, dizziness, lightheadedness, numbness, paraesthesias, weakness, chills, fevers, abdominal pain, nausea, vomiting, diarrhea, blood in stool, neck pain, or back pain. She is tolerating a dysphagia diet. She is passing flatus but has not had a bowel movement. Wt Readings from Last 3 Encounters:   08/06/20 150 lb 11.2 oz (68.4 kg)   08/21/13 158 lb 12.8 oz (72 kg)     Temp Readings from Last 3 Encounters:   08/06/20 98 °F (36.7 °C) (Oral)     BP Readings from Last 3 Encounters:   08/06/20 (!) 153/68   08/21/13 130/70     Pulse Readings from Last 3 Encounters:   08/06/20 62       24 HR INTAKE/OUTPUT :     Intake/Output Summary (Last 24 hours) at 8/6/2020 0856  Last data filed at 8/6/2020 0332  Gross per 24 hour   Intake 593.2 ml   Output 1400 ml   Net -806.8 ml     DIET GENERAL; Dysphagia Soft and Bite-Sized    OBJECTIVE  CURRENT VITALS BP (!) 153/68   Pulse 62   Temp 98 °F (36.7 °C) (Oral)   Resp 18   Ht 5' 5\" (1.651 m)   Wt 150 lb 11.2 oz (68.4 kg)   SpO2 96%   BMI 25.08 kg/m²        GENERAL: alert, cooperative, no distress. Cervical collar remains in place  NEURO: A&Ox3. PERRL. No focal neurological deficits  LUNGS: clear to auscultation bilaterally- no wheezes, rales or rhonchi, normal air movement, no respiratory distress  HEART: normal rate, normal S1 and S2, no gallops, intact distal pulses and no carotid bruits  ABDOMEN: soft, non-tender, non-distended, normal bowel sounds, no masses or organomegaly  EXTREMITY: no cyanosis, no clubbing and no edema      LABS  CBC :   Recent Labs     08/04/20  1150 08/05/20  0345 08/06/20  0327   WBC 9.8 8.9 8.0   HGB 11.0* 10.5* 10.5*   HCT 37.4 34.7* 34.3*   MCV 96.6 97.5 96.3   * 125* 106*     BMP:   Recent Labs     08/05/20  0345      K 4.1      CO2 19*   BUN 30*   CREATININE 1.2     COAGS:   Recent Labs     08/05/20  0345   PROT 6.0*     Pancreas/HFP:  No results for input(s): LIPASE, AMYLASE in the last 72 hours.   Recent Labs     08/05/20  0345   AST 13   ALT 14   BILITOT 0.5 ALKPHOS 67     RADIOLOGY:   Narrative    PROCEDURE: CTA NECK W WO CONTRAST         CLINICAL INFORMATION: rule out vertebral artery injury with type III dens fracture. C2 fracture.         COMPARISON: CT cervical spine 8/4/2020.         TECHNIQUE: 1 mm axial images were obtained through the neck after the fast bolus administration of contrast. A noncontrast localizer was obtained. 3-D reconstructions were performed on a dedicated 3-D workstation. These include multiplanar MPR images,    multiplanar MIP images and centerline reconstructions. Isovue intravenous contrast was given.         All CT scans at this facility use dose modulation, iterative reconstruction, and/or weight-based dosing when appropriate to reduce radiation dose to as low as reasonably achievable.         FINDINGS:              Aortic arch and branches: There is atherosclerosis of the aortic arch. No stenosis is noted. There is no stenosis or vascular injury at the origins of the innominate artery, left common carotid artery or either subclavian artery.         Right common carotid artery/ICA: The right common carotid artery is within acceptable limits. There is some calcified atherosclerosis at the level the right carotid bulb. There is no associated hemodynamically significant stenosis. Using NASCET criteria    and the distal right internal carotid artery as the reference, there is a 40% stenosis at the right internal carotid artery origin. There is no distal stenosis.         Left common carotid artery/ICA: The left common carotid artery is within acceptable limits. There is calcified atherosclerosis of the left carotid bulb. There is no associated hemodynamically significant stenosis. The proximal left internal carotid    artery is tortuous and redundant. There is mild stenosis in the proximal aspect of the tortuosity.  There is no distal stenosis.         Vertebral arteries: On the right, there is mild stenosis at the origin of the right

## 2020-08-06 NOTE — CARE COORDINATION
8/6/20, 2:33 PM EDT    DISCHARGE PLANNING EVALUATION  SW called Pat in admissions for [facility]. Made her aware OT has seen and their note is pending. She will start the precert as soon as the note is in the chart.

## 2020-08-06 NOTE — FLOWSHEET NOTE
08/06/20 0159   Provider Notification   Reason for Communication New orders   Provider Name Leelee Keller Semaj   Provider Notification Advance Practice Clinician (CNS, NP, CNM, CRNA, PA)   Method of Communication Secure Message   Response See orders   Notification Time 0138   New order for caruso catheter placed.

## 2020-08-06 NOTE — PROGRESS NOTES
[facility]  INPATIENT OCCUPATIONAL THERAPY  [facility] NEUROSCIENCES 4A  EVALUATION    Time:   Time In: 1416  Time Out: 1440  Timed Code Treatment Minutes: 10 Minutes  Minutes: 24          Date: 2020  Patient Name: Rosaline Pulido,   Gender: female      MRN: [id number]  : 1929  (80 y.o.)  Referring Practitioner: MISTY Stanford CNP  Diagnosis: Fall, accidental  Additional Pertinent Hx: Per H&P: 80year old female presenting as a transfer in from [place] [address] for treatment of injuries sustained in a fall over 24 hours ago. She reports that she was getting out of bed and fell, hitting her head on the floor. She denies loss of consciousness. She states that she crawled on the floor trying to get up but ended up hitting her medical alert button and help arrived. States that she had some neck pain after the fall, but that she expected that. However, over the course of the 24 hours after the fall, the neck pain got worse prompting her to request evaluation at the ER. She was found to have a C2 fracture and was subsequently transferred for further care. Upon her arrival here, she complained of abdominal pain and a CT scan of the abdomen was obtained. She denies any headache, nausea, vomiting, paresthesias, chest pain, shortness of breath, fever, chills, constipation, diarrhea, back pain. She has no neurological deficits at presentation.     Restrictions/Precautions:  Restrictions/Precautions: Fall Risk, General Precautions  Required Braces or Orthoses  Cervical: c-collar  Position Activity Restriction  Spinal Precautions: No Bending, No Lifting, No Twisting    Subjective  Chart Reviewed: Yes, Orders, Progress Notes, History and Physical, Imaging  Patient assessed for rehabilitation services?: Yes  Family / Caregiver Present: No    Subjective: received in bed, pleasant, min encouragement required  Comments: RN ok'd OT    Pain:  Pain Assessment  Patient Currently in Pain: Yes  Pain Assessment: Faces  Lamas-Baker Pain Rating: Hurts whole lot    Social/Functional History:  Lives With: Alone  [facility])  Home Layout: One level  Home Access: Level entry  Home Equipment: Rolling walker, 4 wheeled walker, Cane   Bathroom Shower/Tub: Walk-in shower  Bathroom Toilet: Bedside commode(over STS)  Bathroom Equipment: Grab bars in shower, Shower chair    Receives Help From: Family  ADL Assistance: [address] [place] [address] Avenue: ([employer] provides 3 meals in room a day, does all laundry, aides at [facility] do cleaning)  Ambulation Assistance: Independent  Transfer Assistance: Independent    Active : No  Patient's  Info: family  Leisure & Hobbies: play cards  Additional Comments: Pt uses RW at all times, occasional use of cane in apt. VISION:WFL    HEARING:  WFL    COGNITION: Decreased Insight and Decreased Safety Awareness    RANGE OF MOTION:  Right Upper Extremity: WNL  Left Upper Extremity:  Impaired - less than 90 degrees at shoulder d/t previous fall    STRENGTH:  Bilateral Upper Extremity:  Not Tested    ADL:   No ADL's completed this session. pt unable to tolerate secondary to pain levels. BALANCE:  Sitting Balance:  Contact Guard Assistance, Minimal Assistance. initially min A progressing to CGA at EOB x6 mins  Standing Balance: Contact Guard Assistance, Minimal Assistance. initially min A progressing to CGA, near constant cues required    BED MOBILITY:  Supine to Sit: Maximum Assistance with cues and inc time for technique  Scooting: Maximum Assistance to EOB and max x2 for boost in chair    TRANSFERS:  Sit to Stand:  Minimal Assistance. EOB  Stand to Sit: Minimal Assistance. control down into chair    FUNCTIONAL MOBILITY:  Assistive Device: Rolling Walker  Assist Level:  Minimal Assistance. Distance: EOB to recliner, x4 feet  Unsteady, poor safety, anxious       Activity Tolerance:  Patient tolerance of  treatment: fair.  Limited by pain Assessment:  Assessment: Pt would continue to benefit from skilled OT intervention to maximize pt safety and independence with performing self care tasks and functional mobility following fall at home and to ensure safe transition to the next level of care and return to OF. Performance deficits / Impairments: Decreased functional mobility , Decreased endurance, Decreased ADL status, Decreased strength, Decreased safe awareness, Decreased balance  Prognosis: Good, Fair  REQUIRES OT FOLLOW UP: Yes    Treatment Initiated: Treatment and education initiated within context of evaluation. Evaluation time included review of current medical information, gathering information related to past medical, social and functional history, completion of standardized testing, formal and informal observation of tasks, assessment of data and development of plan of care and goals. Treatment time included skilled education and facilitation of tasks to increase safety and independence with ADL's for improved functional independence and quality of life. Discharge Recommendations:  Subacute/Skilled Nursing Facility    Patient Education:  OT Education: OT Role, Plan of Care, Precautions, Transfer Training  Patient Education: importance of activity, progression of therapy    Equipment Recommendations:  Equipment Needed: No  Other: defer    Plan:  Times per week: 6x  Current Treatment Recommendations: Strengthening, ROM, Balance Training, Functional Mobility Training, Endurance Training, Safety Education & Training, Pain Management, Self-Care / ADL, Patient/Caregiver Education & Training, Equipment Evaluation, Education, & procurement. See long-term goal time frame for expected duration of plan of care. If no long-term goals established, a short length of stay is anticipated.     Goals:  Patient goals : to have less pain  Short term goals  Time Frame for Short term goals: By discharge  Short term goal 1: pt will safely navigate

## 2020-08-06 NOTE — PROGRESS NOTES
[address] [place] [address]  INPATIENT PHYSICAL THERAPY  DAILY NOTE  [facility] 4A - 4A-08/008-A    Time In: 8775  Time Out: 1100  Timed Code Treatment Minutes: 23 Minutes  Minutes: 23          Date: 2020  Patient Name: Feng Araujo,  Gender:  female        MRN: [id number]  : 1929  (80 y.o.)     Referring Practitioner: Diane DAVIS  Diagnosis: fall, accidental, initial encounter  Additional Pertinent Hx: Per H/P 64: \"80 year old female presenting as a transfer in from [place] [address] for treatment of injuries sustained in a fall over 24 hours ago. She reports that she was getting out of bed and fell, hitting her head on the floor. She denies loss of consciousness. She states that she crawled on the floor trying to get up but ended up hitting her medical alert button and help arrived. States that she had some neck pain after the fall, but that she expected that. However, over the course of the 24 hours after the fall, the neck pain got worse prompting her to request evaluation at the ER. She was found to have a C2 fracture and was subsequently transferred for further care. Upon her arrival here, she complained of abdominal pain and a CT scan of the abdomen was obtained. She denies any headache, nausea, vomiting, paresthesias, chest pain, shortness of breath, fever, chills, constipation, diarrhea, back pain. She has no neurological deficits at presentation. \" No surgical intervention at this time, c-collar worn at all time. Prior Level of Function:  Lives With: Alone  Type of Home: (independent living)  Home Layout: One level  Home Access: Level entry  Home Equipment: Rolling walker, 4 wheeled walker, Cane        ADL Assistance: Independent  Ambulation Assistance: Independent  Transfer Assistance: Independent  Additional Comments: Pt reported use of RW at all times for mobility PTA. Pt denied use of her 4WW secondary to it being too big.  Pt states she was active tolerance of  treatment: fair. Pt tolerated session fair with moderately high pain levels, decreased endurance, strength and safety noted. Equipment Recommendations:Equipment Needed: No  Discharge Recommendations:  Subacute/Skilled Nursing Facility    Plan: Times per week: 5-6xO  Times per day: Daily  Current Treatment Recommendations: Transfer Training, Endurance Training, Patient/Caregiver Education & Training, Gait Training, Home Exercise Program, Functional Mobility Training    Patient Education  Patient Education: Plan of Care, Precautions/Restrictions, Equipment Education, Transfers, Gait, Verbal Exercise Instruction    Goals:  Patient goals : to return home  Short term goals  Time Frame for Short term goals: to discharge  Short term goal 1: rolling to and from supine with log roll precautions and S for ease of bed mobility  Short term goal 2: supine <> sit with log roll precautions and CGA to get in and out of bed  Short term goal 3: sit <> stand with S to improve functional mobility  Short term goal 4: amb 25 ft with RW and S to safely ambulate in room and in independent living safely  Long term goals  Time Frame for Long term goals : NA secondary to short ELOS    Following session, patient left in safe position with all fall risk precautions in place.

## 2020-08-07 LAB
GLUCOSE BLD-MCNC: 141 MG/DL (ref 70–108)
GLUCOSE BLD-MCNC: 145 MG/DL (ref 70–108)
GLUCOSE BLD-MCNC: 186 MG/DL (ref 70–108)
GLUCOSE BLD-MCNC: 290 MG/DL (ref 70–108)
SARS-COV-2, PCR: NOT DETECTED

## 2020-08-07 PROCEDURE — G0378 HOSPITAL OBSERVATION PER HR: HCPCS

## 2020-08-07 PROCEDURE — 97110 THERAPEUTIC EXERCISES: CPT

## 2020-08-07 PROCEDURE — 2580000003 HC RX 258: Performed by: NURSE PRACTITIONER

## 2020-08-07 PROCEDURE — 82948 REAGENT STRIP/BLOOD GLUCOSE: CPT

## 2020-08-07 PROCEDURE — 2060000000 HC ICU INTERMEDIATE R&B

## 2020-08-07 PROCEDURE — 99232 SBSQ HOSP IP/OBS MODERATE 35: CPT | Performed by: SURGERY

## 2020-08-07 PROCEDURE — U0002 COVID-19 LAB TEST NON-CDC: HCPCS

## 2020-08-07 PROCEDURE — 97530 THERAPEUTIC ACTIVITIES: CPT

## 2020-08-07 PROCEDURE — 6370000000 HC RX 637 (ALT 250 FOR IP): Performed by: NURSE PRACTITIONER

## 2020-08-07 PROCEDURE — APPSS60 APP SPLIT SHARED TIME 46-60 MINUTES: Performed by: PHYSICIAN ASSISTANT

## 2020-08-07 RX ORDER — HYDRALAZINE HYDROCHLORIDE 25 MG/1
25 TABLET, FILM COATED ORAL EVERY 8 HOURS PRN
Status: DISCONTINUED | OUTPATIENT
Start: 2020-08-07 | End: 2020-08-10 | Stop reason: HOSPADM

## 2020-08-07 RX ADMIN — SODIUM CHLORIDE, PRESERVATIVE FREE 10 ML: 5 INJECTION INTRAVENOUS at 20:05

## 2020-08-07 RX ADMIN — INSULIN LISPRO 2 UNITS: 100 INJECTION, SOLUTION INTRAVENOUS; SUBCUTANEOUS at 20:07

## 2020-08-07 RX ADMIN — INSULIN LISPRO 1 UNITS: 100 INJECTION, SOLUTION INTRAVENOUS; SUBCUTANEOUS at 11:44

## 2020-08-07 RX ADMIN — DILTIAZEM HYDROCHLORIDE 180 MG: 180 CAPSULE, COATED, EXTENDED RELEASE ORAL at 09:49

## 2020-08-07 RX ADMIN — TRAMADOL HYDROCHLORIDE 50 MG: 50 TABLET, FILM COATED ORAL at 15:12

## 2020-08-07 RX ADMIN — ISOSORBIDE MONONITRATE 30 MG: 30 TABLET ORAL at 09:49

## 2020-08-07 RX ADMIN — PANTOPRAZOLE SODIUM 40 MG: 40 TABLET, DELAYED RELEASE ORAL at 07:02

## 2020-08-07 RX ADMIN — METOPROLOL TARTRATE 25 MG: 50 TABLET, FILM COATED ORAL at 09:49

## 2020-08-07 RX ADMIN — Medication 1 TABLET: at 09:52

## 2020-08-07 RX ADMIN — ROSUVASTATIN CALCIUM 20 MG: 20 TABLET, FILM COATED ORAL at 09:48

## 2020-08-07 RX ADMIN — TRAMADOL HYDROCHLORIDE 50 MG: 50 TABLET, FILM COATED ORAL at 21:19

## 2020-08-07 RX ADMIN — METOPROLOL TARTRATE 25 MG: 50 TABLET, FILM COATED ORAL at 20:06

## 2020-08-07 RX ADMIN — TRAMADOL HYDROCHLORIDE 50 MG: 50 TABLET, FILM COATED ORAL at 07:02

## 2020-08-07 RX ADMIN — BUMETANIDE 1 MG: 1 TABLET ORAL at 09:49

## 2020-08-07 RX ADMIN — OMEGA-3-ACID ETHYL ESTERS 2000 MG: 1 CAPSULE, LIQUID FILLED ORAL at 09:51

## 2020-08-07 RX ADMIN — POLYETHYLENE GLYCOL 3350 17 G: 17 POWDER, FOR SOLUTION ORAL at 09:49

## 2020-08-07 RX ADMIN — LOSARTAN POTASSIUM 25 MG: 25 TABLET, FILM COATED ORAL at 09:49

## 2020-08-07 RX ADMIN — INSULIN LISPRO 1 UNITS: 100 INJECTION, SOLUTION INTRAVENOUS; SUBCUTANEOUS at 16:11

## 2020-08-07 RX ADMIN — SODIUM CHLORIDE, PRESERVATIVE FREE 10 ML: 5 INJECTION INTRAVENOUS at 09:54

## 2020-08-07 ASSESSMENT — PAIN - FUNCTIONAL ASSESSMENT
PAIN_FUNCTIONAL_ASSESSMENT: PREVENTS OR INTERFERES SOME ACTIVE ACTIVITIES AND ADLS

## 2020-08-07 ASSESSMENT — PAIN DESCRIPTION - ONSET
ONSET: ON-GOING

## 2020-08-07 ASSESSMENT — PAIN DESCRIPTION - PROGRESSION
CLINICAL_PROGRESSION: GRADUALLY IMPROVING
CLINICAL_PROGRESSION: NOT CHANGED

## 2020-08-07 ASSESSMENT — PAIN DESCRIPTION - PAIN TYPE
TYPE: ACUTE PAIN

## 2020-08-07 ASSESSMENT — PAIN DESCRIPTION - FREQUENCY
FREQUENCY: INTERMITTENT

## 2020-08-07 ASSESSMENT — PAIN SCALES - GENERAL
PAINLEVEL_OUTOF10: 6
PAINLEVEL_OUTOF10: 0
PAINLEVEL_OUTOF10: 7
PAINLEVEL_OUTOF10: 0
PAINLEVEL_OUTOF10: 7
PAINLEVEL_OUTOF10: 4
PAINLEVEL_OUTOF10: 0
PAINLEVEL_OUTOF10: 0
PAINLEVEL_OUTOF10: 2

## 2020-08-07 ASSESSMENT — PAIN DESCRIPTION - LOCATION
LOCATION: HEAD;NECK
LOCATION: HEAD
LOCATION: HEAD;NECK

## 2020-08-07 ASSESSMENT — PAIN DESCRIPTION - DESCRIPTORS
DESCRIPTORS: ACHING

## 2020-08-07 ASSESSMENT — PAIN DESCRIPTION - ORIENTATION: ORIENTATION: LOWER

## 2020-08-07 NOTE — PROGRESS NOTES
[address]  Occupational Therapy  Daily Note  Time:   Time In: 2586  Time Out: 2952  Timed Code Treatment Minutes: 28 Minutes  Minutes: 28          Date: 2020  Patient Name: Jacquelin Stein,   Gender: female      Room: -008-A  MRN: [id number]  : 1929  (80 y.o.)  Referring Practitioner: MISTY Edwards CNP  Diagnosis: Fall, accidental  Additional Pertinent Hx: Per H&P: 80year old female presenting as a transfer in from [place] [address] for treatment of injuries sustained in a fall over 24 hours ago. She reports that she was getting out of bed and fell, hitting her head on the floor. She denies loss of consciousness. She states that she crawled on the floor trying to get up but ended up hitting her medical alert button and help arrived. States that she had some neck pain after the fall, but that she expected that. However, over the course of the 24 hours after the fall, the neck pain got worse prompting her to request evaluation at the ER. She was found to have a C2 fracture and was subsequently transferred for further care. Upon her arrival here, she complained of abdominal pain and a CT scan of the abdomen was obtained. She denies any headache, nausea, vomiting, paresthesias, chest pain, shortness of breath, fever, chills, constipation, diarrhea, back pain. She has no neurological deficits at presentation. Restrictions/Precautions:  Restrictions/Precautions: Fall Risk, General Precautions  Required Braces or Orthoses  Cervical: c-collar  Position Activity Restriction  Spinal Precautions: No Bending, No Lifting, No Twisting     SUBJECTIVE: Pt lying supine upon arrival, agreeable to OT session with mod encouragement pt reporting increased pain when up. RN approved session stating pt is current with pain meds.     PAIN: Did not rate: Neck/ HA    COGNITION: Slow Processing, Decreased Problem Solving and Decreased Safety Awareness    ADL: Feeding: Minimal Assistance. Pt demod difficulty removing cap from water and bringing water bottle/medication to mouth. Pt provided straw to increase independence. BALANCE:  Sitting Balance:  Stand By Assistance, [address] EOB. Standing Balance: Contact Guard Assistance. x1 minute in prep for mobility. BED MOBILITY:  Supine to Sit: Moderate Assistance ModA raising turnk to seated position EOB. TRANSFERS:  Sit to Stand:  Minimal Assistance. Stand to Sit: Contact Guard Assistance. FUNCTIONAL MOBILITY:  Assistive Device: Rolling Walker  Assist Level:  Contact Guard Assistance. Distance: Completed functional mobility short distance within pt room from EOB to bedside chair at slow pace, no LOB noted. Pt requires max cues for correcting posture- seated rest break after trial of mobility, mod fatigue noted. ADDITIONAL ACTIVITIES:  Completed BUE AROM exercises x10 reps x1 set in all joints/planes within cervical precautions to maintain joint integrity and improve endurance required for ADLs. Pt required rest break between each exercise and min v/c for proper technique. ASSESSMENT:     Activity Tolerance:  Patient tolerance of  treatment: fair. Pt limited by pain. Discharge Recommendations: Subacute/Skilled Nursing Facility   Equipment Recommendations: Equipment Needed: No  Other: defer  Plan: Times per week: 6x  Current Treatment Recommendations: Strengthening, ROM, Balance Training, Functional Mobility Training, Endurance Training, Safety Education & Training, Pain Management, Self-Care / ADL, Patient/Caregiver Education & Training, Equipment Evaluation, Education, & procurement    Patient Education  Patient Education: Home Exercise Program, Precautions, Importance of Increasing Activity and Safety with transfers and mobility.     Goals  Short term goals  Time Frame for Short term goals: By discharge  Short term goal 1: pt will safely navigate short distances, around

## 2020-08-07 NOTE — FLOWSHEET NOTE
Patient is 80 mother and grandmother who fell and \"broke my neck\". She couldn't recall the circumstances of the fall, but said the side of her head and her ear hurt, too. She comes from [facility]. Asked if she likes it there, she says, \"I may as well like it. I don't have a choice! \" Conversation never got very deep- she showed no interest in talking about her family or her 80 years. Offered song and prayer for continued healing. 08/06/20 1815   Encounter Summary   Services provided to: Patient   Referral/Consult From: [facility] Children;Family members   Continue Visiting Yes  (8/6)   Complexity of Encounter Moderate   Length of Encounter 30 minutes   Spiritual/Baptism   Type Spiritual support   Assessment Approachable   Intervention Active listening;Nurtured hope;Prayer;Discussed illness/injury and it's impact; Discussed belief system/Anabaptism practices/yun   Outcome Comfort;Expressed gratitude;Engaged in conversation;Receptive

## 2020-08-07 NOTE — PROGRESS NOTES
[address] 60  PHYSICAL THERAPY MISSED TREATMENT NOTE  [facility] NEUROSCIENCES 4A    Date: 2020  Patient Name: Yelena Rivera        MRN: [id number]   : 1929  (80 y.o.)  Gender: female   Referring Practitioner: Jodee DAVIS  Diagnosis: fall, accidental, initial encounter         REASON FOR MISSED TREATMENT:  RN approved session. Pt resting in BS chair, asleep. Easy to wake, pt reports she finally got comfortable and had just worked with OT. Pt politely  requests therapy to come back later today.

## 2020-08-07 NOTE — PROGRESS NOTES
all laundry, aides at [facility] do cleaning)  Ambulation Assistance: Independent  Transfer Assistance: Independent  Active : No  Additional Comments: Pt uses RW at all times, occasional use of cane in apt. Restrictions/Precautions:  Restrictions/Precautions: Fall Risk, General Precautions  Required Braces or Orthoses  Cervical: c-collar  Position Activity Restriction  Spinal Precautions: No Bending, No Lifting, No Twisting    SUBJECTIVE: RN approved session. Pt in bed upon arrival and agrees to therex only. Pt reporting she had a terrible headahce and since she had laid down it has improved. Pt not wanting to sit up or get OOB. PAIN: no c/o pain at this time    OBJECTIVE:    Exercise:  Patient was guided in 1 set(s) 10 reps of exercise to both lower extremities. Ankle pumps, Glut sets, Quad sets, Heelslides, Hip abduction/adduction and Straight leg raises. Exercises were completed for increased independence with functional mobility. Functional Outcome Measures: Completed  AM-PAC Inpatient Mobility Raw Score : 14  AM-PAC Inpatient T-Scale Score : 38.1    ASSESSMENT:  Assessment: Patient progressing toward established goals. Activity Tolerance:  Patient tolerance of  treatment: good. Pt tolerated session well. Pt demos general weakness and deconditioning.  Pt will benefit from cont PT at this time     Equipment Recommendations:Equipment Needed: No  Discharge Recommendations:  Subacute/Skilled Nursing Facility    Plan: Times per week: 5-6xO  Times per day: Daily  Current Treatment Recommendations: Transfer Training, Endurance Training, Patient/Caregiver Education & Training, Gait Training, Home Exercise Program, Functional Mobility Training    Patient Education  Patient Education: Plan of Care, Verbal Exercise Instruction    Goals:  Patient goals : to return home  Short term goals  Time Frame for Short term goals: to discharge  Short term goal 1: rolling to and from supine with log roll precautions and S for ease of bed mobility  Short term goal 2: supine <> sit with log roll precautions and CGA to get in and out of bed  Short term goal 3: sit <> stand with S to improve functional mobility  Short term goal 4: amb 25 ft with RW and S to safely ambulate in room and in independent living safely  Long term goals  Time Frame for Long term goals : NA secondary to short ELOS    Following session, patient left in safe position with all fall risk precautions in place.

## 2020-08-07 NOTE — PROGRESS NOTES
[facility]  Daily Progress Note    Pt Name: Maria Del Carmen Negron Record Number: [id number]  Date of Birth 12/16/1929   Today's Date: 8/7/2020    HD: # 3    CC: \"hello\"    ASSESSMENT  1. Active Hospital Problems    Diagnosis Date Noted    Fall, accidental, initial encounter [W19. XXXA] 08/04/2020    Closed type III fracture of odontoid process ([address] [S12.120A] 08/04/2020    Acute cystitis without hematuria [N30.00] 08/04/2020         PLAN  Admit to 4A     Type III dens fracture              - Neurosurgery managing non operatively               - Maintain [place] collar              - C-spine precautions              - Neuro checks              - Pain management              - CTA to R/O vertebral artery injury showed no vascular injury              - Patient refusing MRI due to concern for pacemaker   - Dr. Natalia Parker NS believes dens fracture may be non-acute. Recommends conservative treatment with [place]en collar, follow up in 1 month for CT neck and NS outpatient visit     UTI              - Fosfomycin x1 dose     Abdominal pain              - Possible blood vs radiopaque medication in cecum              - Daily CBC stable     CAD, hyperlipidemia, Hx of pacemaker for SSS              - Resume home medications              - Consult Cardiology cancelled, required imaging refused by patient   - Leave hearing aid out of ear when able to reduce ear pain    Pain management              - Ultram or Morphine PRN     General diet, soft dysphagia diet per patient and daughter  Stop IVF hydration  Prophylaxis: SCDs, IS, C&DB, Pepcid, Miralax  PT, OT continue to treat as warranted   consult - pt will need rehab side of [place]  Discharge dispo pending clinical course              - From [place] - pre cert started for [facility]     SUBJECTIVE  Mrs. Syed Jaimes is a 79 Y/O female continuing to present at [address] [place] [address] on 4A following a fall with a past medical history non-tender, non-distended, normal bowel sounds, no masses or organomegaly  EXTREMITY: no cyanosis, no clubbing and no edema      LABS  CBC :   Recent Labs     08/05/20  0345 08/06/20  0327   WBC 8.9 8.0   HGB 10.5* 10.5*   HCT 34.7* 34.3*   MCV 97.5 96.3   * 106*     BMP:   Recent Labs     08/05/20  0345      K 4.1      CO2 19*   BUN 30*   CREATININE 1.2     COAGS:   Recent Labs     08/05/20  0345   PROT 6.0*     Pancreas/HFP:  No results for input(s): LIPASE, AMYLASE in the last 72 hours. Recent Labs     08/05/20  0345   AST 13   ALT 14   BILITOT 0.5   ALKPHOS 72     RADIOLOGY:   Narrative    PROCEDURE: CTA NECK W WO CONTRAST         CLINICAL INFORMATION: rule out vertebral artery injury with type III dens fracture. C2 fracture.         COMPARISON: CT cervical spine 8/4/2020.         TECHNIQUE: 1 mm axial images were obtained through the neck after the fast bolus administration of contrast. A noncontrast localizer was obtained. 3-D reconstructions were performed on a dedicated 3-D workstation. These include multiplanar MPR images,    multiplanar MIP images and centerline reconstructions. Isovue intravenous contrast was given.         All CT scans at this facility use dose modulation, iterative reconstruction, and/or weight-based dosing when appropriate to reduce radiation dose to as low as reasonably achievable.         FINDINGS:              Aortic arch and branches: There is atherosclerosis of the aortic arch. No stenosis is noted. There is no stenosis or vascular injury at the origins of the innominate artery, left common carotid artery or either subclavian artery.         Right common carotid artery/ICA: The right common carotid artery is within acceptable limits. There is some calcified atherosclerosis at the level the right carotid bulb. There is no associated hemodynamically significant stenosis.  Using NASCET criteria    and the distal right internal carotid artery as the reference, there is a 40% stenosis at the right internal carotid artery origin. There is no distal stenosis.         Left common carotid artery/ICA: The left common carotid artery is within acceptable limits. There is calcified atherosclerosis of the left carotid bulb. There is no associated hemodynamically significant stenosis. The proximal left internal carotid    artery is tortuous and redundant. There is mild stenosis in the proximal aspect of the tortuosity. There is no distal stenosis.         Vertebral arteries: On the right, there is mild stenosis at the origin of the right vertebral artery. There is some tortuosity. There is no vascular injury. There is no other stenosis. On the left, there is some calcified atherosclerosis at the origin    the left vertebral artery. There is no associated stenosis. The remainder the left vertebral artery is normal. There is no vascular injury. The vertebral arteries are codominant. Both vertebral arteries are carefully assessed at the C2 level. No vascular     injury is noted.         Intracranial cerebral circulation: There is calcified atherosclerosis of the cavernous segments of the internal carotid arteries bilaterally. No severe stenosis is noted.         Axial source data: The patient has a pacemaker. There are old fractures of posterior upper left ribs. There are degenerative changes of the cervical spine. There are fractures of the C2 vertebral body. The right aspect of the sphenoid sinus is opacified.     The other paranasal sinuses are normally aerated.                   Impression         1. No evidence of vascular injury in the neck. 2. Atherosclerosis of both carotid bulbs without hemodynamically significant stenosis                        **This report has been created using voice recognition software. It may contain minor errors which are inherent in voice recognition technology. **         Final report electronically signed by Dr. Rafaela Elena on 8/5/2020 7:13 AM

## 2020-08-07 NOTE — PLAN OF CARE
Problem: Falls - Risk of:  Goal: Will remain free from falls  Description: Will remain free from falls  Outcome: Ongoing  Note: Patient in fall precautions, nonskid socks on, bed/chair alarm on, fall bracelet on, call light within reach, hourly rounds performed     Problem: Skin Integrity:  Goal: Absence of new skin breakdown  Description: Absence of new skin breakdown  Outcome: Ongoing  Note: No signs of ne skin breakdown, patient turns self but staff will      Problem: Pain:  Goal: Pain level will decrease  Description: Pain level will decrease  Outcome: Ongoing  Note: Patient pain has decreased but has stated it does increase when frequently getting up, pain medication and low stimulation provided     Problem: Safety:  Goal: Free from accidental physical injury  Description: Free from accidental physical injury  Outcome: Ongoing  Note: Patient free from any accidental physical injuries during this shift     Problem: Discharge Planning:  Goal: Patients continuum of care needs are met  Description: Patients continuum of care needs are met  Outcome: Ongoing  Note: Patient working to be discharged back to Health care facility      Problem: Neurological  Goal: Maximum potential motor/sensory/cognitive function  Outcome: Ongoing  Note: Patient able to follow commands, state needs, and alert and oriented x4     Problem: Urinary Elimination:  Goal: Complications related to the disease process, condition or treatment will be avoided or minimized  Description: Complications related to the disease process, condition or treatment will be avoided or minimized  Outcome: Ongoing  Note: Patient has caruso in place    Care plan reviewed with patient. Patient verbalizes understanding of the plan of care and contribute to goal setting.

## 2020-08-07 NOTE — PLAN OF CARE
Problem: Safety:  Goal: Free from intentional harm  Description: Free from intentional harm  Outcome: Met This Shift  Note: Patient not a threat to self harm or harming others. Problem: Falls - Risk of:  Goal: Will remain free from falls  Description: Will remain free from falls  8/7/2020 0227 by Sarah Maher RN  Note: Patient remained free from falls this shift. Bed is in low position with alarm on and siderails up x2. Patient ambulates with one assist. Education given on use of call light and patient voiced understanding. Patient uses call light appropriately. Call light and beside table within reach. Arm band and falling star in place. Problem: Falls - Risk of:  Goal: Absence of physical injury  Description: Absence of physical injury  Outcome: Ongoing  Note: Patient remains free from falls and shows no evidence of new skin breakdown. Problem: Skin Integrity:  Goal: Will show no infection signs and symptoms  Description: Will show no infection signs and symptoms  Outcome: Ongoing  Note: Patient afebrile. No other signs or symptoms of infection present. Problem: Skin Integrity:  Goal: Absence of new skin breakdown  Description: Absence of new skin breakdown  8/7/2020 0139 by Sarah Maher RN  Outcome: Ongoing  Note: Patient turned every two hours. No evidence of new skin breakdown present. Problem: Pain:  Goal: Pain level will decrease  Description: Pain level will decrease  8/7/2020 0139 by Sarah Maher RN  Outcome: Ongoing  Note: Patient complained of pain in head rating it a 5 on the 0-10 scale. Pain medication given as ordered and needed. Patient voiced satisfaction with this. Also instructed patient on distraction, repositioning, and ambulation as non-pharmacological methods of pain relief. Patient voiced understanding.         Problem: Safety:  Goal: Free from accidental physical injury  Description: Free from accidental physical injury  8/7/2020 0139 by Sarah Maher

## 2020-08-08 LAB
GLUCOSE BLD-MCNC: 140 MG/DL (ref 70–108)
GLUCOSE BLD-MCNC: 161 MG/DL (ref 70–108)
GLUCOSE BLD-MCNC: 185 MG/DL (ref 70–108)
GLUCOSE BLD-MCNC: 237 MG/DL (ref 70–108)

## 2020-08-08 PROCEDURE — 6370000000 HC RX 637 (ALT 250 FOR IP): Performed by: PHYSICIAN ASSISTANT

## 2020-08-08 PROCEDURE — 99232 SBSQ HOSP IP/OBS MODERATE 35: CPT | Performed by: SURGERY

## 2020-08-08 PROCEDURE — 6370000000 HC RX 637 (ALT 250 FOR IP): Performed by: NURSE PRACTITIONER

## 2020-08-08 PROCEDURE — 82948 REAGENT STRIP/BLOOD GLUCOSE: CPT

## 2020-08-08 PROCEDURE — 97110 THERAPEUTIC EXERCISES: CPT

## 2020-08-08 PROCEDURE — G0378 HOSPITAL OBSERVATION PER HR: HCPCS

## 2020-08-08 PROCEDURE — APPSS30 APP SPLIT SHARED TIME 16-30 MINUTES: Performed by: PHYSICIAN ASSISTANT

## 2020-08-08 PROCEDURE — 6360000002 HC RX W HCPCS: Performed by: NURSE PRACTITIONER

## 2020-08-08 PROCEDURE — 96376 TX/PRO/DX INJ SAME DRUG ADON: CPT

## 2020-08-08 PROCEDURE — 2580000003 HC RX 258: Performed by: NURSE PRACTITIONER

## 2020-08-08 PROCEDURE — 2060000000 HC ICU INTERMEDIATE R&B

## 2020-08-08 PROCEDURE — 6370000000 HC RX 637 (ALT 250 FOR IP): Performed by: SURGERY

## 2020-08-08 RX ORDER — ALOGLIPTIN 12.5 MG/1
12.5 TABLET, FILM COATED ORAL DAILY
Status: DISCONTINUED | OUTPATIENT
Start: 2020-08-08 | End: 2020-08-10 | Stop reason: HOSPADM

## 2020-08-08 RX ADMIN — PANTOPRAZOLE SODIUM 40 MG: 40 TABLET, DELAYED RELEASE ORAL at 05:55

## 2020-08-08 RX ADMIN — TRAMADOL HYDROCHLORIDE 25 MG: 50 TABLET, FILM COATED ORAL at 16:05

## 2020-08-08 RX ADMIN — DILTIAZEM HYDROCHLORIDE 180 MG: 180 CAPSULE, COATED, EXTENDED RELEASE ORAL at 09:50

## 2020-08-08 RX ADMIN — ISOSORBIDE MONONITRATE 30 MG: 30 TABLET ORAL at 09:50

## 2020-08-08 RX ADMIN — HYDRALAZINE HYDROCHLORIDE 25 MG: 25 TABLET, FILM COATED ORAL at 00:28

## 2020-08-08 RX ADMIN — TRAMADOL HYDROCHLORIDE 50 MG: 50 TABLET, FILM COATED ORAL at 07:34

## 2020-08-08 RX ADMIN — MORPHINE SULFATE 4 MG: 4 INJECTION, SOLUTION INTRAMUSCULAR; INTRAVENOUS at 12:05

## 2020-08-08 RX ADMIN — SODIUM CHLORIDE, PRESERVATIVE FREE 10 ML: 5 INJECTION INTRAVENOUS at 20:25

## 2020-08-08 RX ADMIN — INSULIN LISPRO 1 UNITS: 100 INJECTION, SOLUTION INTRAVENOUS; SUBCUTANEOUS at 20:24

## 2020-08-08 RX ADMIN — Medication 1 TABLET: at 11:47

## 2020-08-08 RX ADMIN — LOSARTAN POTASSIUM 25 MG: 25 TABLET, FILM COATED ORAL at 09:50

## 2020-08-08 RX ADMIN — OMEGA-3-ACID ETHYL ESTERS 2000 MG: 1 CAPSULE, LIQUID FILLED ORAL at 09:50

## 2020-08-08 RX ADMIN — METOPROLOL TARTRATE 25 MG: 50 TABLET, FILM COATED ORAL at 09:51

## 2020-08-08 RX ADMIN — INSULIN LISPRO 2 UNITS: 100 INJECTION, SOLUTION INTRAVENOUS; SUBCUTANEOUS at 11:48

## 2020-08-08 RX ADMIN — SODIUM CHLORIDE, PRESERVATIVE FREE 10 ML: 5 INJECTION INTRAVENOUS at 09:51

## 2020-08-08 RX ADMIN — ROSUVASTATIN CALCIUM 20 MG: 20 TABLET, FILM COATED ORAL at 09:50

## 2020-08-08 RX ADMIN — ALOGLIPTIN 12.5 MG: 12.5 TABLET, FILM COATED ORAL at 09:50

## 2020-08-08 RX ADMIN — Medication 10 ML: at 12:09

## 2020-08-08 RX ADMIN — INSULIN LISPRO 1 UNITS: 100 INJECTION, SOLUTION INTRAVENOUS; SUBCUTANEOUS at 16:44

## 2020-08-08 ASSESSMENT — PAIN - FUNCTIONAL ASSESSMENT
PAIN_FUNCTIONAL_ASSESSMENT: PREVENTS OR INTERFERES SOME ACTIVE ACTIVITIES AND ADLS

## 2020-08-08 ASSESSMENT — PAIN DESCRIPTION - PAIN TYPE
TYPE: ACUTE PAIN

## 2020-08-08 ASSESSMENT — PAIN DESCRIPTION - ORIENTATION
ORIENTATION: LOWER

## 2020-08-08 ASSESSMENT — PAIN DESCRIPTION - FREQUENCY
FREQUENCY: INTERMITTENT

## 2020-08-08 ASSESSMENT — PAIN DESCRIPTION - PROGRESSION
CLINICAL_PROGRESSION: GRADUALLY IMPROVING
CLINICAL_PROGRESSION: GRADUALLY IMPROVING
CLINICAL_PROGRESSION: GRADUALLY WORSENING
CLINICAL_PROGRESSION: GRADUALLY IMPROVING
CLINICAL_PROGRESSION: NOT CHANGED
CLINICAL_PROGRESSION: GRADUALLY IMPROVING

## 2020-08-08 ASSESSMENT — PAIN DESCRIPTION - DESCRIPTORS
DESCRIPTORS: ACHING

## 2020-08-08 ASSESSMENT — PAIN DESCRIPTION - LOCATION
LOCATION: HEAD;NECK

## 2020-08-08 ASSESSMENT — PAIN DESCRIPTION - ONSET
ONSET: ON-GOING

## 2020-08-08 ASSESSMENT — PAIN SCALES - GENERAL
PAINLEVEL_OUTOF10: 6
PAINLEVEL_OUTOF10: 5
PAINLEVEL_OUTOF10: 5
PAINLEVEL_OUTOF10: 4
PAINLEVEL_OUTOF10: 7
PAINLEVEL_OUTOF10: 0
PAINLEVEL_OUTOF10: 7
PAINLEVEL_OUTOF10: 4

## 2020-08-08 NOTE — PLAN OF CARE
will decrease  Outcome: Ongoing  Note: Pt recently rated pain in her neck and head as a 4/10. Pt described it as an aching pain that has been ongoing. Problem: Pain:  Goal: Control of acute pain  Description: Control of acute pain  Outcome: Ongoing  Note: Pt recently rated pain in her neck and head as a 4/10. Pt described it as an aching pain that has been ongoing. Pt was given non pharmacological therapy ex. Repositioning and distraction. Pt was given ultram and pt was satisfied. Problem: Pain:  Goal: Control of chronic pain  Description: Control of chronic pain  Outcome: Ongoing  Note: Pt recently rated pain in her neck and head as a 4/10. Pt described it as an aching pain that has been ongoing. Pt was given non pharmacological therapy ex. Repositioning and distraction. Pt was given ultram and pt was satisfied. Problem: Discharge Planning:  Goal: Patients continuum of care needs are met  Description: Patients continuum of care needs are met  Outcome: Ongoing  Note: Pt's discharge planning is pending depending on medical course. Problem: Urinary Elimination:  Goal: Complications related to the disease process, condition or treatment will be avoided or minimized  Description: Complications related to the disease process, condition or treatment will be avoided or minimized  Outcome: Ongoing  Note: Pt currently has a caruso catheter and no signs and symptoms of infection. Will continue to monitor and assess until discharge.

## 2020-08-08 NOTE — PLAN OF CARE
Problem: Falls - Risk of:  Goal: Will remain free from falls  Description: Will remain free from falls  8/7/2020 2256 by Kecia Hartley RN  Outcome: Ongoing  Note: Patient remains free from falls this shift. Fall precautions in place with bed/chair exit alarmed. Fall sign posted and fall armband in place. Nonskid footwear used with transferring. Educated patient to use call light when in need of staff assistance with transferring, ambulating, and other activities of daily living. Patient appropriately uses call light this shift. Problem: Falls - Risk of:  Goal: Absence of physical injury  Description: Absence of physical injury  Outcome: Ongoing  Note: Call light in reach, bed lowest position, wheels locked, and correct ID band in place. Environment remains clear of fall hazards. Reviewed safety measures with patient/family. Problem: Skin Integrity:  Goal: Will show no infection signs and symptoms  Description: Will show no infection signs and symptoms  Outcome: Ongoing  Note: No signs or symptoms of infection . Problem: Skin Integrity:  Goal: Absence of new skin breakdown  Description: Absence of new skin breakdown  8/7/2020 2256 by Kecia Hartley RN  Outcome: Ongoing  Note: No signs of new skin breakdown with each assessment. Skin remains warm, dry, intact. Mucous membranes pink & moist. Patient is able to turn self. Problem: Pain:  Goal: Pain level will decrease  Description: Pain level will decrease  8/7/2020 2256 by Kecia Hartley RN  Outcome: Ongoing  Note: Pt pain under control with PRN medications. Problem: Pain:  Goal: Control of acute pain  Description: Control of acute pain  Outcome: Ongoing  Note: Pt pain under control with PRN medications. Problem: Pain:  Goal: Control of chronic pain  Description: Control of chronic pain  Outcome: Ongoing  Note: Pt pain under control with PRN medications.       Problem: Safety:  Goal: Free from accidental physical injury  Description: Free from accidental physical injury  8/7/2020 2256 by Mercy Martinez RN  Outcome: Ongoing  Note: Call light in reach, bed lowest position, wheels locked, and correct ID band in place. Environment remains clear of fall hazards. Reviewed safety measures with patient/family. Problem: Safety:  Goal: Free from intentional harm  Description: Free from intentional harm  Outcome: Ongoing  Note: Pt free from intentional harm. Problem: Discharge Planning:  Goal: Patients continuum of care needs are met  Description: Patients continuum of care needs are met  8/7/2020 2256 by Mercy Martinez RN  Outcome: Ongoing  Note: Patients continuum of care needs met this shift. Patient to be discharged to [facility]. Assessed barriers to discharge. Problem: Neurological  Goal: Maximum potential motor/sensory/cognitive function  8/7/2020 2256 by Mercy Martinez RN  Outcome: Ongoing  Note: Patient is alert and appropriate. Oriented x4. Speech remains clear. No signs of aphasia/dysarthria. Denies numbness/tingling. Assessment unchanged.

## 2020-08-08 NOTE — PROGRESS NOTES
[address]  Occupational Therapy  Daily Note  Time:   Time In:   Time Out: 1500  Timed Code Treatment Minutes: 15 Minutes  Minutes: 15          Date: 2020  Patient Name: Jumana Crane,   Gender: female      Room: [facility]008-A  MRN: [id number]  : 1929  (80 y.o.)  Referring Practitioner: MISTY Isbell CNP  Diagnosis: Fall, accidental  Additional Pertinent Hx: Per H&P: 80year old female presenting as a transfer in from [place] [address] for treatment of injuries sustained in a fall over 24 hours ago. She reports that she was getting out of bed and fell, hitting her head on the floor. She denies loss of consciousness. She states that she crawled on the floor trying to get up but ended up hitting her medical alert button and help arrived. States that she had some neck pain after the fall, but that she expected that. However, over the course of the 24 hours after the fall, the neck pain got worse prompting her to request evaluation at the ER. She was found to have a C2 fracture and was subsequently transferred for further care. Upon her arrival here, she complained of abdominal pain and a CT scan of the abdomen was obtained. She denies any headache, nausea, vomiting, paresthesias, chest pain, shortness of breath, fever, chills, constipation, diarrhea, back pain. She has no neurological deficits at presentation. Restrictions/Precautions:  Restrictions/Precautions: Fall Risk, General Precautions  Required Braces or Orthoses  Cervical: c-collar  Position Activity Restriction  Spinal Precautions: No Bending, No Lifting, No Twisting     SUBJECTIVE: Nurse ok'd session. Patient using BSC upon arrival and reported she was finished.  Agreeable to OT session with moderate encouragement     PAIN: 8/10: complains of headache when sitting on BSC, reports it subsides once lying in bed     COGNITION: Decreased Safety Awareness    ADL:   Toileting: Moderate Assistance. For hygiene   Toilet Transfer: [address] From [facility] with cueing provided for technique. BALANCE:  Sitting Balance:  Stand By Assistance. Standing Balance: Contact Guard Assistance. *Politetly declined standing activity this PM d/t headache    BED MOBILITY:  Sit to Supine: Minimal Assistance - bringing BLE into bed, HOB slightly elevated     TRANSFERS:  Sit to Stand:  Contact Guard Assistance. From [facility]   Stand to Sit: Contact Guard Assistance. To EOB  Stand Pivot: Contact Guard Assistance. From [facility] to EOB towards L side at slow pace with cueing provided for technique    ADDITIONAL ACTIVITIES:  Pt completed BUE AROM exercises x15 reps x1 set this date in all joints and all planes in order to increase strength and improve activity tolerance required for functional toilet & shower transfers and ADL routine. Pt tolerated fair, requiring brief rest breaks throughout task. ASSESSMENT:     Activity Tolerance:  Patient tolerance of  treatment: fair. Limited during this session d/t headache       Discharge Recommendations: [address] - would benefit from continued therapy d/t decreased safety.    Equipment Recommendations: Equipment Needed: No  Other: defer  Plan: Times per week: 6x  Current Treatment Recommendations: Strengthening, ROM, Balance Training, Functional Mobility Training, Endurance Training, Safety Education & Training, Pain Management, Self-Care / ADL, Patient/Caregiver Education & Training, Equipment Evaluation, Education, & procurement    Patient Education  Patient Education: ADL's and Home Exercise Program    Goals  Short term goals  Time Frame for Short term goals: By discharge  Short term goal 1: pt will safely navigate short distances, around environmental barriers with SBA for inc indep with toileting  Short term goal 2: pt will demonstrate standing endurance >3 mins in prep for oral care at [facility]  Short term goal 3: pt will demonstrate dressing tasks with setup, SBA and min cues for adaptive techniques for inc indep with self care    Following session, patient left in safe position with all fall risk precautions in place.

## 2020-08-09 LAB
GLUCOSE BLD-MCNC: 165 MG/DL (ref 70–108)
GLUCOSE BLD-MCNC: 169 MG/DL (ref 70–108)
GLUCOSE BLD-MCNC: 175 MG/DL (ref 70–108)
GLUCOSE BLD-MCNC: 192 MG/DL (ref 70–108)

## 2020-08-09 PROCEDURE — 6370000000 HC RX 637 (ALT 250 FOR IP): Performed by: NURSE PRACTITIONER

## 2020-08-09 PROCEDURE — 2580000003 HC RX 258: Performed by: NURSE PRACTITIONER

## 2020-08-09 PROCEDURE — G0378 HOSPITAL OBSERVATION PER HR: HCPCS

## 2020-08-09 PROCEDURE — APPSS15 APP SPLIT SHARED TIME 0-15 MINUTES: Performed by: PHYSICIAN ASSISTANT

## 2020-08-09 PROCEDURE — 99232 SBSQ HOSP IP/OBS MODERATE 35: CPT | Performed by: SURGERY

## 2020-08-09 PROCEDURE — 94760 N-INVAS EAR/PLS OXIMETRY 1: CPT

## 2020-08-09 PROCEDURE — 2060000000 HC ICU INTERMEDIATE R&B

## 2020-08-09 PROCEDURE — 82948 REAGENT STRIP/BLOOD GLUCOSE: CPT

## 2020-08-09 PROCEDURE — 6370000000 HC RX 637 (ALT 250 FOR IP): Performed by: SURGERY

## 2020-08-09 RX ADMIN — Medication 1 TABLET: at 08:34

## 2020-08-09 RX ADMIN — METOPROLOL TARTRATE 25 MG: 50 TABLET, FILM COATED ORAL at 08:34

## 2020-08-09 RX ADMIN — ROSUVASTATIN CALCIUM 20 MG: 20 TABLET, FILM COATED ORAL at 08:33

## 2020-08-09 RX ADMIN — OMEGA-3-ACID ETHYL ESTERS 2000 MG: 1 CAPSULE, LIQUID FILLED ORAL at 08:33

## 2020-08-09 RX ADMIN — TRAMADOL HYDROCHLORIDE 50 MG: 50 TABLET, FILM COATED ORAL at 08:34

## 2020-08-09 RX ADMIN — SODIUM CHLORIDE, PRESERVATIVE FREE 10 ML: 5 INJECTION INTRAVENOUS at 21:48

## 2020-08-09 RX ADMIN — INSULIN LISPRO 1 UNITS: 100 INJECTION, SOLUTION INTRAVENOUS; SUBCUTANEOUS at 11:29

## 2020-08-09 RX ADMIN — SODIUM CHLORIDE, PRESERVATIVE FREE 10 ML: 5 INJECTION INTRAVENOUS at 08:34

## 2020-08-09 RX ADMIN — INSULIN LISPRO 1 UNITS: 100 INJECTION, SOLUTION INTRAVENOUS; SUBCUTANEOUS at 21:48

## 2020-08-09 RX ADMIN — METOPROLOL TARTRATE 25 MG: 50 TABLET, FILM COATED ORAL at 21:48

## 2020-08-09 RX ADMIN — ISOSORBIDE MONONITRATE 30 MG: 30 TABLET ORAL at 08:34

## 2020-08-09 RX ADMIN — TRAMADOL HYDROCHLORIDE 50 MG: 50 TABLET, FILM COATED ORAL at 00:10

## 2020-08-09 RX ADMIN — ALOGLIPTIN 12.5 MG: 12.5 TABLET, FILM COATED ORAL at 08:34

## 2020-08-09 RX ADMIN — LOSARTAN POTASSIUM 25 MG: 25 TABLET, FILM COATED ORAL at 08:34

## 2020-08-09 RX ADMIN — INSULIN LISPRO 1 UNITS: 100 INJECTION, SOLUTION INTRAVENOUS; SUBCUTANEOUS at 17:59

## 2020-08-09 RX ADMIN — DILTIAZEM HYDROCHLORIDE 180 MG: 180 CAPSULE, COATED, EXTENDED RELEASE ORAL at 08:34

## 2020-08-09 RX ADMIN — PANTOPRAZOLE SODIUM 40 MG: 40 TABLET, DELAYED RELEASE ORAL at 05:34

## 2020-08-09 RX ADMIN — INSULIN LISPRO 1 UNITS: 100 INJECTION, SOLUTION INTRAVENOUS; SUBCUTANEOUS at 08:34

## 2020-08-09 ASSESSMENT — PAIN DESCRIPTION - PROGRESSION
CLINICAL_PROGRESSION: GRADUALLY IMPROVING
CLINICAL_PROGRESSION: GRADUALLY WORSENING
CLINICAL_PROGRESSION: GRADUALLY WORSENING
CLINICAL_PROGRESSION: GRADUALLY IMPROVING

## 2020-08-09 ASSESSMENT — PAIN SCALES - GENERAL
PAINLEVEL_OUTOF10: 4
PAINLEVEL_OUTOF10: 5
PAINLEVEL_OUTOF10: 0
PAINLEVEL_OUTOF10: 7
PAINLEVEL_OUTOF10: 7
PAINLEVEL_OUTOF10: 0
PAINLEVEL_OUTOF10: 0
PAINLEVEL_OUTOF10: 7

## 2020-08-09 ASSESSMENT — PAIN DESCRIPTION - ONSET
ONSET: ON-GOING

## 2020-08-09 ASSESSMENT — PAIN - FUNCTIONAL ASSESSMENT
PAIN_FUNCTIONAL_ASSESSMENT: PREVENTS OR INTERFERES SOME ACTIVE ACTIVITIES AND ADLS
PAIN_FUNCTIONAL_ASSESSMENT: ACTIVITIES ARE NOT PREVENTED
PAIN_FUNCTIONAL_ASSESSMENT: ACTIVITIES ARE NOT PREVENTED

## 2020-08-09 ASSESSMENT — PAIN DESCRIPTION - PAIN TYPE
TYPE: ACUTE PAIN

## 2020-08-09 ASSESSMENT — PAIN DESCRIPTION - ORIENTATION
ORIENTATION: ANTERIOR
ORIENTATION: LOWER
ORIENTATION: LOWER

## 2020-08-09 ASSESSMENT — PAIN DESCRIPTION - FREQUENCY
FREQUENCY: INTERMITTENT
FREQUENCY: CONTINUOUS
FREQUENCY: CONTINUOUS

## 2020-08-09 ASSESSMENT — PAIN DESCRIPTION - LOCATION
LOCATION: BACK
LOCATION: HEAD
LOCATION: HEAD;NECK

## 2020-08-09 ASSESSMENT — PAIN DESCRIPTION - DESCRIPTORS
DESCRIPTORS: HEADACHE
DESCRIPTORS: ACHING
DESCRIPTORS: ACHING

## 2020-08-09 NOTE — PROGRESS NOTES
Clemencia Cala  Daily Progress Note    Pt Name: Maria Del Carmen Negron Record Number: [id number]  Date of Birth 12/16/1929   Today's Date: 8/8/2020    HD: # 4    CC: \"hello\"    ASSESSMENT  1. Active Hospital Problems    Diagnosis Date Noted    Fall, accidental, initial encounter [W19. XXXA] 08/04/2020    Closed type III fracture of odontoid process ([address] [S12.120A] 08/04/2020    Acute cystitis without hematuria [N30.00] 08/04/2020         PLAN  Admit to 4A     Type III dens fracture              - Neurosurgery managing non operatively               - Maintain [place] collar              - C-spine precautions              - Neuro checks              - Pain management              - CTA to R/O vertebral artery injury showed no vascular injury              - Patient refusing MRI due to concern for pacemaker   - Dr. Terra Kearns NS believes dens fracture may be non-acute. Recommends conservative treatment with Aspen collar, follow up in 1 month for CT neck and NS outpatient visit     UTI              - Fosfomycin x1 dose     Abdominal pain              - Possible blood vs radiopaque medication in cecum              - Daily CBC stable     CAD, hyperlipidemia, Hx of pacemaker for SSS              - Resume home medications              - Consult Cardiology cancelled, required imaging refused by patient   - Leave hearing aid out of ear when able to reduce ear pain    Pain management              - Ultram or Morphine PRN     General diet, soft dysphagia diet per patient and daughter  Stop IVF hydration  Prophylaxis: SCDs, IS, C&DB, Pepcid, Miralax  PT, OT continue to treat as warranted   consult - pt will need rehab side of [place]  Discharge dispo pending clinical course              - [place] - pre cert started for [facility]     SUBJECTIVE  .  153Moira Marathon August is a 81 Y/O female continuing to present at [facility] on 4A following a fall with a past medical history of GERD, DM, arthritis, CHF, esophageal stricture, and other comorbidities. She reports a mild headache and new onset left ear pain has improved since yesterday. Patient denies chest pain, shortness of breath, cough, headache, dizziness, lightheadedness, numbness, paraesthesias, weakness, chills, fevers, abdominal pain, nausea, vomiting, diarrhea, blood in stool, neck pain, or back pain. She is tolerating a dysphagia diet. She is passing flatus but has not had a bowel movement, urinary catheter in place. COVID ordered, not detected 8/7. Vitals stable on exam. Awaiting precert for placement to rehab ECF    Wt Readings from Last 3 Encounters:   08/08/20 156 lb 11.2 oz (71.1 kg)   08/21/13 158 lb 12.8 oz (72 kg)     Temp Readings from Last 3 Encounters:   08/08/20 98.8 °F (37.1 °C) (Oral)     BP Readings from Last 3 Encounters:   08/08/20 (!) 100/55   08/21/13 130/70     Pulse Readings from Last 3 Encounters:   08/08/20 80       24 HR INTAKE/OUTPUT :     Intake/Output Summary (Last 24 hours) at 8/8/2020 2319  Last data filed at 8/8/2020 1332  Gross per 24 hour   Intake 120 ml   Output 850 ml   Net -730 ml     DIET GENERAL; Dysphagia Soft and Bite-Sized    OBJECTIVE  CURRENT VITALS BP (!) 100/55   Pulse 80   Temp 98.8 °F (37.1 °C) (Oral)   Resp 16   Ht 5' 5\" (1.651 m)   Wt 156 lb 11.2 oz (71.1 kg)   SpO2 90%   BMI 26.08 kg/m²        GENERAL: alert, cooperative, no distress. Cervical collar remains in place  EARS: Set evenly on head. No apparent external trauma, bleeding, or deformity. NEURO: A&Ox3. PERRL.   No focal neurological deficits  LUNGS: clear to auscultation bilaterally- no wheezes, rales or rhonchi, normal air movement, no respiratory distress  HEART: normal rate, normal S1 and S2, no gallops, intact distal pulses and no carotid bruits  ABDOMEN: soft, non-tender, non-distended, normal bowel sounds, no masses or organomegaly  EXTREMITY: no cyanosis, no clubbing and no edema      LABS  CBC :   Recent Labs     08/06/20  0327   WBC 8.0   HGB 10.5*   HCT 34.3*   MCV 96.3   *     BMP:   No results for input(s): NA, K, CL, CO2, BUN, CREATININE in the last 72 hours. COAGS:   No results for input(s): APTT, PROT, INR in the last 72 hours. Pancreas/HFP:  No results for input(s): LIPASE, AMYLASE in the last 72 hours. No results for input(s): AST, ALT, BILIDIR, BILITOT, ALKPHOS in the last 72 hours. RADIOLOGY:   Narrative    PROCEDURE: CTA NECK W WO CONTRAST         CLINICAL INFORMATION: rule out vertebral artery injury with type III dens fracture. C2 fracture.         COMPARISON: CT cervical spine 8/4/2020.         TECHNIQUE: 1 mm axial images were obtained through the neck after the fast bolus administration of contrast. A noncontrast localizer was obtained. 3-D reconstructions were performed on a dedicated 3-D workstation. These include multiplanar MPR images,    multiplanar MIP images and centerline reconstructions. Isovue intravenous contrast was given.         All CT scans at this facility use dose modulation, iterative reconstruction, and/or weight-based dosing when appropriate to reduce radiation dose to as low as reasonably achievable.         FINDINGS:              Aortic arch and branches: There is atherosclerosis of the aortic arch. No stenosis is noted. There is no stenosis or vascular injury at the origins of the innominate artery, left common carotid artery or either subclavian artery.         Right common carotid artery/ICA: The right common carotid artery is within acceptable limits. There is some calcified atherosclerosis at the level the right carotid bulb. There is no associated hemodynamically significant stenosis. Using NASCET criteria    and the distal right internal carotid artery as the reference, there is a 40% stenosis at the right internal carotid artery origin. There is no distal stenosis.         Left common carotid artery/ICA:  The left common carotid artery is within acceptable limits. There is calcified atherosclerosis of the left carotid bulb. There is no associated hemodynamically significant stenosis. The proximal left internal carotid    artery is tortuous and redundant. There is mild stenosis in the proximal aspect of the tortuosity. There is no distal stenosis.         Vertebral arteries: On the right, there is mild stenosis at the origin of the right vertebral artery. There is some tortuosity. There is no vascular injury. There is no other stenosis. On the left, there is some calcified atherosclerosis at the origin    the left vertebral artery. There is no associated stenosis. The remainder the left vertebral artery is normal. There is no vascular injury. The vertebral arteries are codominant. Both vertebral arteries are carefully assessed at the C2 level. No vascular     injury is noted.         Intracranial cerebral circulation: There is calcified atherosclerosis of the cavernous segments of the internal carotid arteries bilaterally. No severe stenosis is noted.         Axial source data: The patient has a pacemaker. There are old fractures of posterior upper left ribs. There are degenerative changes of the cervical spine. There are fractures of the C2 vertebral body. The right aspect of the sphenoid sinus is opacified.     The other paranasal sinuses are normally aerated.                   Impression         1. No evidence of vascular injury in the neck. 2. Atherosclerosis of both carotid bulbs without hemodynamically significant stenosis                        **This report has been created using voice recognition software. It may contain minor errors which are inherent in voice recognition technology. **         Final report electronically signed by Dr. Fara Turner on 8/5/2020 7:13 AM          Electronically signed by LYNDSAY Dick on 8/8/2020 at 11:19 PM   Patient seen and evaluated alert and oriented she remains stable.   No bowel movement as yet she does report flatus. Bowel regimen ordered. Abdomen benign. Continue Aspen collar. Precertification process started for rehab at her care facility. She was currently in independent living/assisted living. Agree as documented.

## 2020-08-09 NOTE — PLAN OF CARE
Problem: Falls - Risk of:  Goal: Will remain free from falls  Description: Will remain free from falls  8/9/2020 by Whit Sun RN  Outcome: Ongoing  Note: Patient remains free from falls this shift. Fall precautions in place with bed/chair exit alarmed. Fall sign posted and fall armband in place. Nonskid footwear used with transferring. Educated patient to use call light when in need of staff assistance with transferring, ambulating, and other activities of daily living. Patient appropriately uses call light this shift.         Problem: Falls - Risk of:  Goal: Absence of physical injury  Description: Absence of physical injury  Outcome: Ongoing  Note: Call light in reach, bed lowest position, wheels locked, and correct ID band in place. Environment remains clear of fall hazards. Reviewed safety measures with patient/family.         Problem: Skin Integrity:  Goal: Will show no infection signs and symptoms  Description: Will show no infection signs and symptoms  Outcome: Ongoing  Note: No signs or symptoms of infection . Problem: Skin Integrity:  Goal: Absence of new skin breakdown  Description: Absence of new skin breakdown  8/9/2020 by Whit Sun RN  Outcome: Ongoing  Note: No signs of new skin breakdown with each assessment. Skin remains warm, dry, intact. Mucous membranes pink & moist. Patient is able to turn self.         Problem: Pain:  Goal: Pain level will decrease  Description: Pain level will decrease  8/9/2020 by Whit Sun RN  Outcome: Ongoing  Note: Pt pain under control with PRN medications. Problem: Pain:  Goal: Control of acute pain  Description: Control of acute pain  Outcome: Ongoing  Note: Pt pain under control with PRN medications. Problem: Pain:  Goal: Control of chronic pain  Description: Control of chronic pain  Outcome: Ongoing  Note: Pt pain under control with PRN medications.       Problem: Safety:  Goal: Free from accidental physical injury  Description: Free from accidental physical injury  8/9/2020 by Pia Tavarez RN  Outcome: Ongoing  Note: Call light in reach, bed lowest position, wheels locked, and correct ID band in place. Environment remains clear of fall hazards. Reviewed safety measures with patient/family.         Problem: Safety:  Goal: Free from intentional harm  Description: Free from intentional harm  Outcome: Ongoing  Note: Pt free from intentional harm. Problem: Discharge Planning:  Goal: Patients continuum of care needs are met  Description: Patients continuum of care needs are met  8/9/2020 by Pia Tavarez RN  Outcome: Ongoing  Note: Patients continuum of care needs met this shift. Patient to be discharged to [facility]. Assessed barriers to discharge.          Problem: Neurological  Goal: Maximum potential motor/sensory/cognitive function  8/9/2020 by Pia Tavarez RN  Outcome: Ongoing  Note: Patient is alert and appropriate. Oriented x4. Speech remains clear. No signs of aphasia/dysarthria. Denies numbness/tingling. Assessment unchanged.

## 2020-08-09 NOTE — PLAN OF CARE
Problem: Falls - Risk of:  Goal: Will remain free from falls  Description: Will remain free from falls  Outcome: Met This Shift  Note: Patient remained free from falls this shift. Bed is in low position with alarm on and siderails up x2. Patient ambulates with one assist and a walker. Education given on use of call light and patient voiced understanding. Patient uses call light appropriately. Call light and beside table within reach. Arm band and falling star in place. Goal: Absence of physical injury  Description: Absence of physical injury  Outcome: Met This Shift  Note: Patient remained free from falls this shift. Bed is in low position with alarm on and siderails up x2. Patient ambulates with one assist and a walker. Education given on use of call light and patient voiced understanding. Patient uses call light appropriately. Call light and beside table within reach. Arm band and falling star in place. Problem: Skin Integrity:  Goal: Will show no infection signs and symptoms  Description: Will show no infection signs and symptoms  Outcome: Met This Shift  Note: BP (!) 177/79   Pulse 80   Temp 98.2 °F (36.8 °C) (Oral)   Resp 18   Ht 5' 5\" (1.651 m)   Wt 159 lb 14.4 oz (72.5 kg)   SpO2 93%   BMI 26.61 kg/m²   Pt remains free from s/s of infection this shift. Goal: Absence of new skin breakdown  Description: Absence of new skin breakdown  Outcome: Met This Shift  Note: Pt remains free from new skin breakdown this shift. Patient able to reposition self in bed. Pillow support provided. Problem: Pain:  Description: Pain management should include both nonpharmacologic and pharmacologic interventions. Goal: Control of chronic pain  Description: Control of chronic pain  Outcome: Met This Shift  Note: Patient complained of pain in lower back and head rating it a 7 on the 0-10 scale. Pain medication given as ordered and needed. Patient voiced satisfaction with this.  Also instructed patient on distraction, repositioning, and rest as non-pharmacological methods of pain relief. Patient voiced understanding. Problem: Safety:  Goal: Free from accidental physical injury  Description: Free from accidental physical injury  Outcome: Met This Shift  Note: Patient remained free from falls this shift. Bed is in low position with alarm on and siderails up x2. Patient ambulates with one assist. Education given on use of call light and patient voiced understanding. Patient uses call light appropriately. Call light and beside table within reach. Arm band and falling star in place. Problem: Discharge Planning:  Goal: Patients continuum of care needs are met  Description: Patients continuum of care needs are met  Outcome: Met This Shift  Note: Pt plans to go to [facility] for Rehab at discharge. Problem: Neurological  Goal: Maximum potential motor/sensory/cognitive function  Outcome: Met This Shift  Note: Pt alert and oriented x 4. Able to move all extremities without difficulty. Patient remains in C-Collar throughout shift. Problem: Urinary Elimination:  Goal: Complications related to the disease process, condition or treatment will be avoided or minimized  Description: Complications related to the disease process, condition or treatment will be avoided or minimized  Outcome: Ongoing  Note: Pt continues to have [place] in place. Bladder training attempted without success. Problem: Pain:  Description: Pain management should include both nonpharmacologic and pharmacologic interventions. Goal: Control of acute pain  Description: Control of acute pain  Outcome: Ongoing  Note: Patient complained of pain in lower back and head rating it a 7 on the 0-10 scale. Pain medication given as ordered and needed. Patient voiced satisfaction with this. Also instructed patient on distraction, repositioning, and rest as non-pharmacological methods of pain relief. Patient voiced understanding.     Plan of Care

## 2020-08-09 NOTE — PROGRESS NOTES
Jesenia Gordon  Daily Progress Note    Pt Name: Maria Del Carmen Negron Record Number: [id number]  Date of Birth 12/16/1929   Today's Date: 8/9/2020    HD: # 5    CC: \"hello\"    ASSESSMENT  1. Active Hospital Problems    Diagnosis Date Noted    Fall, accidental, initial encounter [W19. XXXA] 08/04/2020    Closed type III fracture of odontoid process ([address] [S12.120A] 08/04/2020    Acute cystitis without hematuria [N30.00] 08/04/2020         PLAN  Admit to 4A     Type III dens fracture              - Neurosurgery managing non operatively               - Maintain [place] collar              - C-spine precautions              - Neuro checks              - Pain management              - CTA to R/O vertebral artery injury showed no vascular injury              - Patient refusing MRI due to concern for pacemaker   - Dr. Etelvina Andrew NS believes dens fracture may be non-acute. Recommends conservative treatment with Aspen collar, follow up in 1 month for CT neck and NS outpatient visit     UTI              - Fosfomycin x1 dose     Abdominal pain              - Possible blood vs radiopaque medication in cecum              - Daily CBC stable     CAD, hyperlipidemia, Hx of pacemaker for SSS              - Resume home medications              - Consult Cardiology cancelled, required imaging refused by patient   - Leave hearing aid out of ear when able to reduce ear pain. Ear pain resolved. Pain management              - Ultram or Morphine PRN     General diet, soft dysphagia diet per patient and daughter  Stop IVF hydration  Prophylaxis: SCDs, IS, C&DB, Pepcid, Miralax  PT, OT continue to treat as warranted   consult - pt will need rehab side of [place]  Discharge dispo pending clinical course              - From [place] - pre cert started for [facility]     SUBJECTIVE  Mrs. Tuyet Raygoza is a 79 Y/O female continuing to present at [address] [place] [address] on 4A following a fall with a last 72 hours. BMP:   No results for input(s): NA, K, CL, CO2, BUN, CREATININE in the last 72 hours. COAGS:   No results for input(s): APTT, PROT, INR in the last 72 hours. Pancreas/HFP:  No results for input(s): LIPASE, AMYLASE in the last 72 hours. No results for input(s): AST, ALT, BILIDIR, BILITOT, ALKPHOS in the last 72 hours. RADIOLOGY:   Narrative    PROCEDURE: CTA NECK W WO CONTRAST         CLINICAL INFORMATION: rule out vertebral artery injury with type III dens fracture. C2 fracture.         COMPARISON: CT cervical spine 8/4/2020.         TECHNIQUE: 1 mm axial images were obtained through the neck after the fast bolus administration of contrast. A noncontrast localizer was obtained. 3-D reconstructions were performed on a dedicated 3-D workstation. These include multiplanar MPR images,    multiplanar MIP images and centerline reconstructions. Isovue intravenous contrast was given.         All CT scans at this facility use dose modulation, iterative reconstruction, and/or weight-based dosing when appropriate to reduce radiation dose to as low as reasonably achievable.         FINDINGS:              Aortic arch and branches: There is atherosclerosis of the aortic arch. No stenosis is noted. There is no stenosis or vascular injury at the origins of the innominate artery, left common carotid artery or either subclavian artery.         Right common carotid artery/ICA: The right common carotid artery is within acceptable limits. There is some calcified atherosclerosis at the level the right carotid bulb. There is no associated hemodynamically significant stenosis. Using NASCET criteria    and the distal right internal carotid artery as the reference, there is a 40% stenosis at the right internal carotid artery origin. There is no distal stenosis.         Left common carotid artery/ICA: The left common carotid artery is within acceptable limits. There is calcified atherosclerosis of the left carotid bulb. There is no associated hemodynamically significant stenosis. The proximal left internal carotid    artery is tortuous and redundant. There is mild stenosis in the proximal aspect of the tortuosity. There is no distal stenosis.         Vertebral arteries: On the right, there is mild stenosis at the origin of the right vertebral artery. There is some tortuosity. There is no vascular injury. There is no other stenosis. On the left, there is some calcified atherosclerosis at the origin    the left vertebral artery. There is no associated stenosis. The remainder the left vertebral artery is normal. There is no vascular injury. The vertebral arteries are codominant. Both vertebral arteries are carefully assessed at the C2 level. No vascular     injury is noted.         Intracranial cerebral circulation: There is calcified atherosclerosis of the cavernous segments of the internal carotid arteries bilaterally. No severe stenosis is noted.         Axial source data: The patient has a pacemaker. There are old fractures of posterior upper left ribs. There are degenerative changes of the cervical spine. There are fractures of the C2 vertebral body. The right aspect of the sphenoid sinus is opacified.     The other paranasal sinuses are normally aerated.                   Impression         1. No evidence of vascular injury in the neck. 2. Atherosclerosis of both carotid bulbs without hemodynamically significant stenosis                        **This report has been created using voice recognition software. It may contain minor errors which are inherent in voice recognition technology. **         Final report electronically signed by Dr. Kathia Waggoner on 8/5/2020 7:13 AM          Electronically signed by LYNDSAY Joya on 8/9/2020 at 5:28 AM     Patient seen and evaluated independently. Remains stable. Awaiting placement. Continue supportive care and therapies. Continue cervical collar. Agree as documented.   Care coordinated with [employer], PA. All new data reviewed.

## 2020-08-10 VITALS
BODY MASS INDEX: 26.64 KG/M2 | DIASTOLIC BLOOD PRESSURE: 61 MMHG | TEMPERATURE: 98.9 F | SYSTOLIC BLOOD PRESSURE: 130 MMHG | RESPIRATION RATE: 16 BRPM | OXYGEN SATURATION: 94 % | HEART RATE: 86 BPM | WEIGHT: 159.9 LBS | HEIGHT: 65 IN

## 2020-08-10 LAB
AMORPHOUS: ABNORMAL
ANION GAP SERPL CALCULATED.3IONS-SCNC: 11 MEQ/L (ref 8–16)
BACTERIA: ABNORMAL /HPF
BILIRUBIN URINE: NEGATIVE
BLOOD, URINE: ABNORMAL
BUN BLDV-MCNC: 29 MG/DL (ref 7–22)
CALCIUM SERPL-MCNC: 8.7 MG/DL (ref 8.5–10.5)
CASTS 2: ABNORMAL /LPF
CASTS UA: ABNORMAL /LPF
CHARACTER, URINE: ABNORMAL
CHLORIDE BLD-SCNC: 108 MEQ/L (ref 98–111)
CO2: 21 MEQ/L (ref 23–33)
COLOR: ABNORMAL
CREAT SERPL-MCNC: 1.1 MG/DL (ref 0.4–1.2)
CRYSTALS, UA: ABNORMAL
EPITHELIAL CELLS, UA: ABNORMAL /HPF
GFR SERPL CREATININE-BSD FRML MDRD: 47 ML/MIN/1.73M2
GLUCOSE BLD-MCNC: 132 MG/DL (ref 70–108)
GLUCOSE BLD-MCNC: 151 MG/DL (ref 70–108)
GLUCOSE BLD-MCNC: 182 MG/DL (ref 70–108)
GLUCOSE BLD-MCNC: 211 MG/DL (ref 70–108)
GLUCOSE URINE: NEGATIVE MG/DL
KETONES, URINE: NEGATIVE
LEUKOCYTE ESTERASE, URINE: ABNORMAL
MISCELLANEOUS 2: ABNORMAL
NITRITE, URINE: NEGATIVE
PH UA: 5 (ref 5–9)
POTASSIUM SERPL-SCNC: 4.4 MEQ/L (ref 3.5–5.2)
PROTEIN UA: 100
RBC URINE: > 200 /HPF
RENAL EPITHELIAL, UA: ABNORMAL
SODIUM BLD-SCNC: 140 MEQ/L (ref 135–145)
SPECIFIC GRAVITY, URINE: 1.01 (ref 1–1.03)
UROBILINOGEN, URINE: 0.2 EU/DL (ref 0–1)
WBC UA: ABNORMAL /HPF
YEAST: ABNORMAL

## 2020-08-10 PROCEDURE — 2580000003 HC RX 258: Performed by: NURSE PRACTITIONER

## 2020-08-10 PROCEDURE — 99233 SBSQ HOSP IP/OBS HIGH 50: CPT | Performed by: SURGERY

## 2020-08-10 PROCEDURE — 80048 BASIC METABOLIC PNL TOTAL CA: CPT

## 2020-08-10 PROCEDURE — 6370000000 HC RX 637 (ALT 250 FOR IP): Performed by: NURSE PRACTITIONER

## 2020-08-10 PROCEDURE — 82948 REAGENT STRIP/BLOOD GLUCOSE: CPT

## 2020-08-10 PROCEDURE — 81001 URINALYSIS AUTO W/SCOPE: CPT

## 2020-08-10 PROCEDURE — G0378 HOSPITAL OBSERVATION PER HR: HCPCS

## 2020-08-10 PROCEDURE — 87186 SC STD MICRODIL/AGAR DIL: CPT

## 2020-08-10 PROCEDURE — 6370000000 HC RX 637 (ALT 250 FOR IP): Performed by: PHYSICIAN ASSISTANT

## 2020-08-10 PROCEDURE — 94760 N-INVAS EAR/PLS OXIMETRY 1: CPT

## 2020-08-10 PROCEDURE — 87077 CULTURE AEROBIC IDENTIFY: CPT

## 2020-08-10 PROCEDURE — 97110 THERAPEUTIC EXERCISES: CPT

## 2020-08-10 PROCEDURE — APPSS60 APP SPLIT SHARED TIME 46-60 MINUTES: Performed by: PHYSICIAN ASSISTANT

## 2020-08-10 PROCEDURE — 6370000000 HC RX 637 (ALT 250 FOR IP): Performed by: SURGERY

## 2020-08-10 PROCEDURE — 87086 URINE CULTURE/COLONY COUNT: CPT

## 2020-08-10 PROCEDURE — 97530 THERAPEUTIC ACTIVITIES: CPT

## 2020-08-10 PROCEDURE — 36415 COLL VENOUS BLD VENIPUNCTURE: CPT

## 2020-08-10 RX ORDER — SULFAMETHOXAZOLE AND TRIMETHOPRIM 400; 80 MG/1; MG/1
1 TABLET ORAL 2 TIMES DAILY
Qty: 14 TABLET | Refills: 0
Start: 2020-08-10 | End: 2020-08-10 | Stop reason: SDUPTHER

## 2020-08-10 RX ORDER — TRAMADOL HYDROCHLORIDE 50 MG/1
50 TABLET ORAL EVERY 8 HOURS PRN
Qty: 15 TABLET | Refills: 0 | Status: SHIPPED | OUTPATIENT
Start: 2020-08-10 | End: 2020-08-15

## 2020-08-10 RX ORDER — SULFAMETHOXAZOLE AND TRIMETHOPRIM 400; 80 MG/1; MG/1
1 TABLET ORAL 2 TIMES DAILY
Qty: 14 TABLET | Refills: 0 | Status: SHIPPED | OUTPATIENT
Start: 2020-08-10 | End: 2020-08-17

## 2020-08-10 RX ADMIN — ISOSORBIDE MONONITRATE 30 MG: 30 TABLET ORAL at 09:04

## 2020-08-10 RX ADMIN — Medication 1 TABLET: at 09:04

## 2020-08-10 RX ADMIN — TRAMADOL HYDROCHLORIDE 50 MG: 50 TABLET, FILM COATED ORAL at 12:43

## 2020-08-10 RX ADMIN — SODIUM CHLORIDE, PRESERVATIVE FREE 10 ML: 5 INJECTION INTRAVENOUS at 09:04

## 2020-08-10 RX ADMIN — ALOGLIPTIN 12.5 MG: 12.5 TABLET, FILM COATED ORAL at 09:04

## 2020-08-10 RX ADMIN — PANTOPRAZOLE SODIUM 40 MG: 40 TABLET, DELAYED RELEASE ORAL at 06:52

## 2020-08-10 RX ADMIN — LOSARTAN POTASSIUM 25 MG: 25 TABLET, FILM COATED ORAL at 09:03

## 2020-08-10 RX ADMIN — INSULIN LISPRO 1 UNITS: 100 INJECTION, SOLUTION INTRAVENOUS; SUBCUTANEOUS at 14:47

## 2020-08-10 RX ADMIN — METOPROLOL TARTRATE 25 MG: 50 TABLET, FILM COATED ORAL at 09:04

## 2020-08-10 RX ADMIN — HYDRALAZINE HYDROCHLORIDE 25 MG: 25 TABLET, FILM COATED ORAL at 09:04

## 2020-08-10 RX ADMIN — DILTIAZEM HYDROCHLORIDE 180 MG: 180 CAPSULE, COATED, EXTENDED RELEASE ORAL at 12:44

## 2020-08-10 RX ADMIN — ROSUVASTATIN CALCIUM 20 MG: 20 TABLET, FILM COATED ORAL at 09:04

## 2020-08-10 RX ADMIN — INSULIN LISPRO 1 UNITS: 100 INJECTION, SOLUTION INTRAVENOUS; SUBCUTANEOUS at 07:58

## 2020-08-10 RX ADMIN — BUMETANIDE 1 MG: 1 TABLET ORAL at 09:04

## 2020-08-10 ASSESSMENT — PAIN SCALES - GENERAL
PAINLEVEL_OUTOF10: 6
PAINLEVEL_OUTOF10: 0

## 2020-08-10 NOTE — CARE COORDINATION
8/10/20, 10:06 AM EDT    DISCHARGE PLANNING EVALUATION  Spoke with Joshua Montgomery at [facility]. She told SW that the precert was approved on Saturday.

## 2020-08-10 NOTE — PROGRESS NOTES
[address] [address]  INPATIENT PHYSICAL THERAPY  DAILY NOTE  MAGGIE RETAS 4A - 4A-08/008-A    Time In: 1001  Time Out: 1029  Timed Code Treatment Minutes: 28 Minutes  Minutes: 28          Date: 8/10/2020  Patient Name: Patsy Ness,  Gender:  female        MRN: [id number]  : 1929  (80 y.o.)     Referring Practitioner: Donita DAVIS  Diagnosis: fall, accidental, initial encounter  Additional Pertinent Hx: Per H/P 64: \"80 year old female presenting as a transfer in from [place] [address] for treatment of injuries sustained in a fall over 24 hours ago. She reports that she was getting out of bed and fell, hitting her head on the floor. She denies loss of consciousness. She states that she crawled on the floor trying to get up but ended up hitting her medical alert button and help arrived. States that she had some neck pain after the fall, but that she expected that. However, over the course of the 24 hours after the fall, the neck pain got worse prompting her to request evaluation at the ER. She was found to have a C2 fracture and was subsequently transferred for further care. Upon her arrival here, she complained of abdominal pain and a CT scan of the abdomen was obtained. She denies any headache, nausea, vomiting, paresthesias, chest pain, shortness of breath, fever, chills, constipation, diarrhea, back pain. She has no neurological deficits at presentation. \" No surgical intervention at this time, c-collar worn at all time.      Prior Level of Function:  Lives With: Alone  Type of Home: ([facility])  Home Layout: One level  Home Access: Level entry  Home Equipment: Rolling walker, 4 wheeled walker, Cane   Bathroom Shower/Tub: Walk-in shower  Bathroom Toilet: Bedside commode(over STS)  Bathroom Equipment: Grab bars in shower, Shower chair    Receives Help From: Family  ADL Assistance: Independent  Homemaking Assistance: (elder provides 3 meals in room a day, does all laundry, aides at [facility] do cleaning)  Ambulation Assistance: Independent  Transfer Assistance: Independent  Active : No  Additional Comments: Pt uses RW at all times, occasional use of cane in apt. Restrictions/Precautions:  Restrictions/Precautions: Fall Risk, General Precautions  Required Braces or Orthoses  Cervical: c-collar  Position Activity Restriction  Spinal Precautions: No Bending, No Lifting, No Twisting    SUBJECTIVE: RN approved session. Patient laying in bed upon arrival, family member present to observe session. PAIN: 7/10: left side of head and neck    OBJECTIVE:  Bed Mobility:  Rolling to Left: Contact Guard Assistance, Minimal Assistance, with verbal cues , with increased time for completion, used bedrails, cues for hand placement and log roll technique   Rolling to Right: Contact Guard Assistance, Minimal Assistance, with verbal cues , with increased time for completion, used bedrails, cues for hand placement and log roll technique  Supine to Sit: Minimal Assistance, Moderate Assistance, X 1, with head of bed raised, with verbal cues , with increased time for completion, assist to elevate trunk, performed x2 trials  Sit to Supine: Contact Guard Assistance, with increased time for completion, CGA at [facility]   Scooting: [employer], to edge of bed  **upon sitting upright, patient c/o left head/neck pain and dizziness, requested to lay back down, able to complete another supine>sit transfer after short break, however continued to c/o dizziness and pain    Transfers:  Sit to Stand: Contact Guard Assistance  Stand to Sit:Contact Guard Assistance    Ambulation:  Contact Guard Assistance  Distance: ~12ft x1 around in room  Surface: Level Tile  Device:Rolling Walker  Gait Deviations:   Forward Flexed Posture, Slow Senait, Decreased Step Length Bilaterally, Decreased Gait Speed and Decreased Heel Strike Bilaterally    Exercise:  Patient was guided in 1 set(s) 10 reps of exercise to both lower extremities. Ankle pumps, Glut sets, Quad sets, Heelslides, Hip abduction/adduction and Straight leg raises. Exercises were completed for increased independence with functional mobility. Functional Outcome Measures: Completed  AM-PAC Inpatient Mobility Raw Score : 15  AM-PAC Inpatient T-Scale Score : 39.45    ASSESSMENT:  Assessment: Patient progressing toward established goals. Activity Tolerance:  Patient tolerance of  treatment: fair. Equipment Recommendations:Equipment Needed: No  Discharge Recommendations:  Subacute/Skilled Nursing Facility    Plan: Times per week: 5-6xO  Times per day: Daily  Current Treatment Recommendations: Transfer Training, Endurance Training, Patient/Caregiver Education & Training, Gait Training, Home Exercise Program, Functional Mobility Training    Patient Education  Patient Education: Plan of Care, Family Education, [employer] Mobility, Transfers, Gait, Verbal Exercise Instruction    Goals:  Patient goals : to return home  Short term goals  Time Frame for Short term goals: to discharge  Short term goal 1: rolling to and from supine with log roll precautions and S for ease of bed mobility  Short term goal 2: supine <> sit with log roll precautions and CGA to get in and out of bed  Short term goal 3: sit <> stand with S to improve functional mobility  Short term goal 4: amb 25 ft with RW and S to safely ambulate in room and in independent living safely  Long term goals  Time Frame for Long term goals : NA secondary to short ELOS    Following session, patient left in safe position with all fall risk precautions in place.

## 2020-08-10 NOTE — DISCHARGE INSTR - COC
Continuity of Care Form    Patient Name: Vignesh Perez   :  1929  MRN:  [id number]    Admit date:  2020  Discharge date:  8/10/2020    Code Status Order: Limited   Advance Directives:   Advance Care Flowsheet Documentation     Date/Time Healthcare Directive Type of Healthcare Directive Copy in [address] Agent's Name Healthcare Agent's Phone Number    [phone]  Yes, patient has an advance directive for healthcare treatment  Durable power of  for health care;Living will  No, copy requested from family  Healthcare power of   daughters Asya Mckeon  --          Admitting Physician:  Kathya Tapia MD  PCP: Shahnaz Cronin MD    Discharging Nurse: [facility] Unit/Room#: 4A-08/008-A  Discharging Unit Phone Number: [phone]    Emergency Contact:   Extended Emergency Contact Information  Primary Emergency Contact: Sean Romano of [address] [place] [address] Phone: [phone]  Relation: None  Secondary Emergency Contact: Bobby Lee  [employer] Phone: [phone]  Relation: Child  Preferred language: English   needed? No    Past Surgical History:  Past Surgical History:   Procedure Laterality Date    APPENDECTOMY      CARDIAC SURGERY      stent    CYSTOCELE REPAIR      HYSTERECTOMY      JOINT REPLACEMENT      left knee    PACEMAKER PLACEMENT      RECTOCELE REPAIR      THYROIDECTOMY, PARTIAL         Immunization History: There is no immunization history on file for this patient. Active Problems:  Patient Active Problem List   Diagnosis Code    Chronic kidney disease, stage III (moderate) [facility] N18.3    Hyperlipidemia E78.5    Hypertension I10    Atherosclerotic heart disease I25.10    Fall, accidental, initial encounter W19. XXXA    Closed type III fracture of odontoid process ([facility] [address] S12.120A    Acute cystitis without hematuria N30.00       Isolation/Infection:   Isolation          No Isolation        Patient Infection Status     Infection Onset Added Last Indicated Last Indicated By Review Planned Expiration Resolved Resolved By    None active    Resolved    COVID-19 Rule Out 08/07/20 08/07/20 08/07/20 COVID-19 (Ordered)   08/07/20 Rule-Out Test Resulted          Nurse Assessment:  Last Vital Signs: BP (!) 178/79   Pulse 78   Temp 98.6 °F (37 °C) (Oral)   Resp 18   Ht 5' 5\" (1.651 m)   Wt 159 lb 14.4 oz (72.5 kg)   SpO2 91%   BMI 26.61 kg/m²     Last documented pain score (0-10 scale): Pain Level: 0  Last Weight:   Wt Readings from Last 1 Encounters:   08/09/20 159 lb 14.4 oz (72.5 kg)     Mental Status:  oriented    IV Access:  - None    Nursing Mobility/ADLs:  Walking   Assisted  Transfer  Assisted  Bathing  Assisted  Dressing  Assisted  Toileting  Assisted  Feeding  Independent  Med Admin  Dependent  Med Delivery   prefers mixed with applesauce , whole pills    Wound Care Documentation and Therapy:        Elimination:  Continence:   · Bowel: Yes  · Bladder: Yes  Urinary Catheter: None   Colostomy/Ileostomy/Ileal Conduit: No       Date of Last BM: 8/10/2020    Intake/Output Summary (Last 24 hours) at 8/10/2020 0943  Last data filed at 8/10/2020 0352  Gross per 24 hour   Intake 70 ml   Output 450 ml   Net -380 ml     I/O last 3 completed shifts: In: 79 [P.O.:60; I.V.:10]  Out: 450 [Urine:450]    Safety Concerns:     History of Falls (last 30 days)    Impairments/Disabilities:      Hearing    Nutrition Therapy:  Current Nutrition Therapy:   - Oral Diet:  Carb Control 5 carbs/meal (2000kcals/day)    Routes of Feeding: Oral  Liquids: No Restrictions  Daily Fluid Restriction: no  Last Modified Barium Swallow with Video (Video Swallowing Test): not done    Treatments at the Time of Hospital Discharge:   Respiratory Treatments: none  Oxygen Therapy:  is not on home oxygen therapy.   Ventilator:    - No ventilator support    Rehab Therapies: Physical Therapy and Occupational Therapy  Weight Bearing Status/Restrictions: No weight bearing restirctions  Other Medical Equipment (for information only, NOT a DME order):  braces Aspen Collar to be worn at all times  Other Treatments: none    Patient's personal belongings (please select all that are sent with patient):  Hearing Aides bilateral    RN SIGNATURE:  Electronically signed by Lakisha Arroyo RN on 8/10/20 at 1:44 PM EDT    CASE MANAGEMENT/SOCIAL WORK SECTION    Inpatient Status Date: 8/4/2020    Readmission Risk Assessment Score:  Readmission Risk              Risk of Unplanned Readmission:        13           Discharging to Facility/ Agency   · [address], [facility], [address]  · Phone:[phone]  · Fax:[phone]  Dialysis Facility (if applicable)   · Name:  · Address:  · Dialysis Schedule:  · Phone:  · Fax:    / signature: Electronically signed by Candice Shea. RODRIGO Matamoros on 8/10/20 at 9:45 AM EDT    PHYSICIAN SECTION    Prognosis: {Prognosis:9693080864}    Condition at Discharge: [address] Patient Condition:[id number]}    Rehab Potential (if transferring to Rehab): {Prognosis:5453332813}    Recommended Labs or Other Treatments After Discharge: ***    Physician Certification: I certify the above information and transfer of Beckie Ruiz  is necessary for the continuing treatment of the diagnosis listed and that she requires {Admit to Appropriate Level of Care:15081} for {GREATER/LESS:194439425} 30 days.      Update Admission H&P: {CHP DME Changes in BXODN:072281793}    PHYSICIAN SIGNATURE:  Electronically signed by Abi Laguna MD on 8/10/20 at 1:48 PM EDT

## 2020-08-10 NOTE — PLAN OF CARE
Problem: Falls - Risk of:  Goal: Will remain free from falls  Description: Will remain free from falls  Outcome: Ongoing  Note: Patient educated on and encouraged to use the call light for assistance from staff with needs. Patient verbalizes an understanding of this. Bed wheels locked and bed in lowest position; bed alarm on. Patient possessions are within reach; pathways are clear. Problem: Falls - Risk of:  Goal: Absence of physical injury  Description: Absence of physical injury  Outcome: Ongoing  Note: Patient educated on and encouraged to use the call light for assistance from staff with needs. Patient verbalizes an understanding of this. Bed wheels locked and bed in lowest position; bed alarm on. Patient possessions are within reach; pathways are clear. Problem: Skin Integrity:  Goal: Will show no infection signs and symptoms  Description: Will show no infection signs and symptoms  Outcome: Ongoing  Note: No new skin integrity issues noted during this shift. No signs and/or symptoms of infection noted during this shift. Patient remains afebrile. Patient able to turn self in bed; staff assistance with pillow support provided as needed. Calvillo care provided. Bilateral heels floated off bed. Problem: Skin Integrity:  Goal: Absence of new skin breakdown  Description: Absence of new skin breakdown  Outcome: Ongoing  Note: No new skin integrity issues noted during this shift. No signs and/or symptoms of infection noted during this shift. Patient remains afebrile. Patient able to turn self in bed; staff assistance with pillow support provided as needed. Calvillo care provided. Bilateral heels floated off bed. Problem: Pain:  Goal: Pain level will decrease  Description: Pain level will decrease  Outcome: Ongoing  Note: Patient denies pain during this shift. Will continue to monitor.      Problem: Pain:  Goal: Control of acute pain  Description: Control of acute pain  Outcome: Ongoing  Note: Patient denies pain during this shift. Will continue to monitor. Problem: Pain:  Goal: Control of chronic pain  Description: Control of chronic pain  Outcome: Ongoing  Note: Patient denies pain during this shift. Will continue to monitor. Problem: Safety:  Goal: Free from accidental physical injury  Description: Free from accidental physical injury  Outcome: Ongoing  Note: Patient educated on and encouraged to use the call light for assistance from staff with needs. Patient verbalizes an understanding of this. Bed wheels locked and bed in lowest position; bed alarm on. Patient possessions are within reach; pathways are clear. Problem: Discharge Planning:  Goal: Patients continuum of care needs are met  Description: Patients continuum of care needs are met  Outcome: Ongoing  Note: Patient able to express needs to staff. Discharge planning in progress. Problem: Neurological  Goal: Maximum potential motor/sensory/cognitive function  Outcome: Ongoing  Note: Patient A&O x4 during this shift. All extremities active. Problem: Urinary Elimination:  Goal: Complications related to the disease process, condition or treatment will be avoided or minimized  Description: Complications related to the disease process, condition or treatment will be avoided or minimized  Outcome: Ongoing  Note: Patient has adequate output. Calvillo care provided. Care plan reviewed with patient. Patient verbalizes understanding of the plan of care and contributes to goal setting.

## 2020-08-10 NOTE — CARE COORDINATION
8/10/20, 12:59 PM EDT    Patient goals/plan/ treatment preferences discussed by  and . Patient goals/plan/ treatment preferences reviewed with patient/ family. Patient/ family verbalize understanding of discharge plan and are in agreement with goal/plan/treatment preferences. Understanding was demonstrated using the teach back method. AVS provided by RN at time of discharge, which includes all necessary medical information pertaining to the patients current course of illness, treatment, post-discharge goals of care, and treatment preferences. Services After Discharge  Services At/After Discharge: Skilled Therapy, Nursing Services, In ambulance([facility]/ Ambulance)   IMM Letter  IMM Letter given to Patient/Family/Significant other/Guardian/POA/by[de-identified] CM  IMM Letter date given[de-identified] 08/10/20  IMM Letter time given[de-identified] Gonsalo Willis will be discharged to [facility] today. She will be skilled under her [employer] Carry Medicare benefit. She will be transported by ambulance. Discharge instructions and transport forms are attached to her blue discharge packet. Brie and her family are aware of discharge plan. Spoke with Cyn Ospina from [facility] and she is aware SW is requesting a 3:00pm transport time.

## 2020-08-10 NOTE — PROGRESS NOTES
[facility]  Daily Progress Note    Pt Name: Maira Del Carmen Negron Record Number: [id number]  Date of Birth 12/16/1929   Today's Date: 8/10/2020    HD: # 6    CC: \"hello\"    ASSESSMENT  1. Active Hospital Problems    Diagnosis Date Noted    Fall, accidental, initial encounter [W19. XXXA] 08/04/2020    Closed type III fracture of odontoid process ([address] [S12.120A] 08/04/2020    Acute cystitis without hematuria [N30.00] 08/04/2020         PLAN  Admit to 4A     Type III dens fracture              - Neurosurgery managing non operatively               - Maintain [place] collar              - C-spine precautions              - Neuro checks              - Pain management              - CTA to R/O vertebral artery injury showed no vascular injury              - Patient refusing MRI due to concern for pacemaker   - Dr. Hannah Mccall NS believes dens fracture may be non-acute. Recommends conservative treatment with Aspen collar, follow up in 1 month for CT neck and NS outpatient visit     UTI              - Fosfomycin x1 dose   - Prescribed Bactrim 400-80 BID x 7 days at discharge for Large leukocyte esterase in UA. - Follow up with urology in 2 weeks     Abdominal pain              - Possible blood vs radiopaque medication in cecum              - Daily CBC stable     CAD, hyperlipidemia, Hx of pacemaker for SSS              - Resume home medications              - Consult Cardiology cancelled, required imaging refused by patient   - Leave hearing aid out of ear when able to reduce ear pain. Ear pain resolved.     Pain management              - Ultram or Morphine PRN     General diet, soft dysphagia diet per patient and daughter  Stop IVF hydration  Prophylaxis: SCDs, IS, C&DB, Pepcid, Miralax  PT, OT continue to treat as warranted   consult - pt will need rehab side of [address] [place] [address]  Discharge dispo pending clinical course              - From [place] - pre cert started for [facility]   - Planning discharge at 1500 today     SUBJECTIVE  Mrs. Binta Wagner is a 81 Y/O female continuing to present at [address] on 4A following a fall with a past medical history of GERD, DM, arthritis, CHF, esophageal stricture, and other comorbidities. She reports a mild headache and neck pain, ear pain resolved. Patient denies chest pain, shortness of breath, cough, headache, dizziness, lightheadedness, numbness, paraesthesias, weakness, chills, fevers, abdominal pain, nausea, vomiting, diarrhea, blood in stool, or back pain. She is tolerating a dysphagia diet. She is passing flatus, urinary catheter in place. COVID ordered, not detected 8/7. Vitals stable on exam. Awaiting precert for placement to rehab ECF. Urine dark red and cloudy per Dr. Mynor Hope. Care in coordination with trauma surgeon Dr. Priyanka Pemberton    Wt Readings from Last 3 Encounters:   08/09/20 159 lb 14.4 oz (72.5 kg)   08/21/13 158 lb 12.8 oz (72 kg)     Temp Readings from Last 3 Encounters:   08/10/20 98.5 °F (36.9 °C) (Oral)     BP Readings from Last 3 Encounters:   08/10/20 (!) 171/77   08/21/13 130/70     Pulse Readings from Last 3 Encounters:   08/10/20 71       24 HR INTAKE/OUTPUT :     Intake/Output Summary (Last 24 hours) at 8/10/2020 0755  Last data filed at 8/10/2020 0352  Gross per 24 hour   Intake 70 ml   Output 450 ml   Net -380 ml     DIET GENERAL; Dysphagia Soft and Bite-Sized    OBJECTIVE  CURRENT VITALS BP (!) 171/77   Pulse 71   Temp 98.5 °F (36.9 °C) (Oral)   Resp 16   Ht 5' 5\" (1.651 m)   Wt 159 lb 14.4 oz (72.5 kg)   SpO2 94%   BMI 26.61 kg/m²        GENERAL: alert, cooperative, no distress. Cervical collar remains in place  EARS: Set evenly on head. No apparent external trauma, bleeding, or deformity. NEURO: A&Ox3. PERRL.   No focal neurological deficits  LUNGS: clear to auscultation bilaterally- no wheezes, rales or rhonchi, normal air movement, no respiratory distress  HEART: normal rate, normal S1 and S2, no gallops, intact distal pulses and no carotid bruits  ABDOMEN: soft, non-tender, non-distended, normal bowel sounds, no masses or organomegaly  EXTREMITY: no cyanosis, no clubbing and no edema      LABS  CBC :   No results for input(s): WBC, HGB, HCT, MCV, PLT in the last 72 hours. BMP:   No results for input(s): NA, K, CL, CO2, BUN, CREATININE in the last 72 hours. COAGS:   No results for input(s): APTT, PROT, INR in the last 72 hours. Pancreas/HFP:  No results for input(s): LIPASE, AMYLASE in the last 72 hours. No results for input(s): AST, ALT, BILIDIR, BILITOT, ALKPHOS in the last 72 hours. RADIOLOGY:   Narrative    PROCEDURE: CTA NECK W WO CONTRAST         CLINICAL INFORMATION: rule out vertebral artery injury with type III dens fracture. C2 fracture.         COMPARISON: CT cervical spine 8/4/2020.         TECHNIQUE: 1 mm axial images were obtained through the neck after the fast bolus administration of contrast. A noncontrast localizer was obtained. 3-D reconstructions were performed on a dedicated 3-D workstation. These include multiplanar MPR images,    multiplanar MIP images and centerline reconstructions. Isovue intravenous contrast was given.         All CT scans at this facility use dose modulation, iterative reconstruction, and/or weight-based dosing when appropriate to reduce radiation dose to as low as reasonably achievable.         FINDINGS:              Aortic arch and branches: There is atherosclerosis of the aortic arch. No stenosis is noted. There is no stenosis or vascular injury at the origins of the innominate artery, left common carotid artery or either subclavian artery.         Right common carotid artery/ICA: The right common carotid artery is within acceptable limits. There is some calcified atherosclerosis at the level the right carotid bulb. There is no associated hemodynamically significant stenosis.  Using NASCET criteria    and the distal right internal carotid artery as the 8/5/2020 7:13 AM          Electronically signed by LYNDSAY Ivan on 8/10/2020 at 7:55 AM   Patient seen and evaluated independently this a.m. Neurologically intact. Urine appears bloody. Consider trial of voiding. UA still some blood and bacteria. Discharge to [facility] on Bactrim may need to consider urology evaluation if patient is agreeable. Discharge care coordinated. Care coordinated with LYNDSAY Young. Agree as documented. Greater than 45 minutes spent on daily care and coordination of discharge. Agree as documented.

## 2020-08-11 NOTE — DISCHARGE SUMMARY
Discharge Summary   Trauma Services    Patient Identification:  Annie Nogueira  : 1929  MRN: [id number]   Account: [de-identified]     Admit date: 2020  Discharge date: 08/10/20  Attending provider: Corey Stewart MD        Primary care provider: Angie Vazquez MD     Discharge Diagnoses: Active Problems:    Fall, accidental, initial encounter    Closed type III fracture of odontoid process ([address]    Acute cystitis without hematuria  Resolved Problems:    * No resolved hospital problems. [facility] Course:   Annie Nogueira is a 80 y.o. female admitted to [address] [place] [address] on 2020 for type III dens fracture and UTI following a fall with no known loss of consciousness. Admitted under trauma services to  and seen by consulting Neurosurgery . Neurosurgery managed dens fracture nonoperatively secondary to patient refusing MRI after learning it would require her pacemaker to be turned off during study. Neurosurgery considering fracture may not be acute. Patient discharged to [facility] rehab with instructions for follow up with neurosurgery and nephrology. UA at discharge still suggestive of UTI cultured Klebsiella pneumoniae, patient discharged on 7 days Bactrim 400-80. Patient agreeable to discharge to nursing home rehab and verbalized understanding of discharge instructions and [place]sey[place] collar to remain in place. Patient given opportunity to ask questions, all inquiries answered. Care and discharge disposition in coordination with consulting providers and trauma surgeon Dr. Trent Gonzalez.      Discharge Medications:   Adele Cancino   Home Medication Instructions [id number]    Printed on:08/10/20 2003   Medication Information                      acetaminophen (TYLENOL) 325 MG tablet  Take 650 mg by mouth every 6 hours as needed for Pain             acetaminophen (TYLENOL) 500 MG tablet  Take 1,000 mg by mouth nightly             aspirin 81 MG EC tablet  Take 81 mg by mouth daily. atorvastatin (LIPITOR) 40 MG tablet  Take 40 mg by mouth nightly             clopidogrel (PLAVIX) 75 MG tablet  Take 75 mg by mouth daily. diltiazem (DILACOR XR) 180 MG XR capsule  Take 180 mg by mouth daily. fluticasone (FLONASE) 50 MCG/ACT nasal spray  1 spray by Each Nostril route nightly             furosemide (LASIX) 40 MG tablet  Take 40 mg by mouth daily             isosorbide mononitrate (IMDUR) 30 MG CR tablet  Take 30 mg by mouth daily. lidocaine (LIDODERM) 5 %  Place 1 patch onto the skin daily 12 hours on, 12 hours off to left lateral thorax. losartan (COZAAR) 25 MG tablet  Take 12.5 mg by mouth daily              metoprolol (LOPRESSOR) 25 MG tablet  Take 25 mg by mouth 2 times daily. nitroGLYCERIN (NITROSTAT) 0.4 MG SL tablet  Place 0.4 mg under the tongue every 5 minutes as needed. Omega-3 Fatty Acids (FISH OIL) 1000 MG CAPS  Take 2,000 mg by mouth daily. polyethylene glycol (GLYCOLAX) 17 GM/SCOOP powder  Take 17 g by mouth daily as needed             potassium chloride (KLOR-CON) 10 MEQ extended release tablet  Take 10 mEq by mouth daily             sertraline (ZOLOFT) 100 MG tablet  Take 100 mg by mouth nightly             SITagliptin (JANUVIA) 50 MG tablet  Take 50 mg by mouth daily             sulfamethoxazole-trimethoprim (BACTRIM) 400-80 MG per tablet  Take 1 tablet by mouth 2 times daily for 7 days             traMADol (ULTRAM) 50 MG tablet  Take 1 tablet by mouth every 8 hours as needed for Pain for up to 5 days. vitamin B-1 (THIAMINE) 100 MG tablet  Take 100 mg by mouth daily                 Patient Instructions:    Discharge lab work:    Activity: activity as tolerated  Diet: DIET GENERAL; Dysphagia Soft and Bite-Sized    Code Status: Limited    Follow-up visits:   Sal Henry MD  [address] [place] [address] [address] [address] [facility] [place] [facility] 5[facility]        Catrachito Mccormack MD  [address] [facility] [place]    Go on 9/9/2020  11:30    [address] [facility] [address] [address] [phone]  Go on 8/31/2020  10:40 arrive at 10:10 CT Cervical w wo contrast    Javier Galvez MD  [facility]  [address] [facility] 46    Go on 8/14/2020  11:00       Procedures:     Consults:   Neurosurgery    Examination:  Vitals:  Vitals:    08/10/20 0845 08/10/20 0917 08/10/20 1314 08/10/20 1559   BP: (!) 178/79  (!) 117/53 130/61   Pulse: 78  83 86   Resp: 18  18 16   Temp: 98.6 °F (37 °C)  98.1 °F (36.7 °C) 98.9 °F (37.2 °C)   TempSrc: Oral  Oral Oral   SpO2: 95% 91% 94% 94%   Weight:       Height:         Weight: Weight: 159 lb 14.4 oz (72.5 kg)     24 hour intake/output:    Intake/Output Summary (Last 24 hours) at 8/10/2020 2003  Last data filed at 8/10/2020 1328  Gross per 24 hour   Intake 490 ml   Output 590 ml   Net -100 ml     GENERAL: alert, cooperative, no distress. Cervical collar remains in place  EARS: Set evenly on head. No apparent external trauma, bleeding, or deformity. NEURO: A&Ox3. PERRL. No focal neurological deficits  LUNGS: clear to auscultation bilaterally- no wheezes, rales or rhonchi, normal air movement, no respiratory distress  HEART: normal rate, normal S1 and S2, no gallops, intact distal pulses and no carotid bruits  ABDOMEN: soft, non-tender, non-distended, normal bowel sounds, no masses or organomegaly  EXTREMITY: no cyanosis, no clubbing and no edema    Significant Diagnostics:   Radiology: Ct Abdomen Pelvis Wo Contrast Additional Contrast? None    Result Date: 8/4/2020  PROCEDURE: CT ABDOMEN PELVIS WO CONTRAST CLINICAL INFORMATION: L pelvic pain, subjective abd distension . COMPARISON: No prior study.  TECHNIQUE: 2-D multiplanar noncontrast images of the abdomen and pelvis All CT scans at this facility use dose modulation, iterative reconstruction, and/or weight-based dosing when appropriate to reduce radiation dose to as low as reasonably achievable. FINDINGS: Limitations: Solid organ and hollow viscera evaluation limited without contrast Lung bases Cardiomegaly. AICD present. Lung bases are clear. 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 There is a small gallstone present. Spleen is normal. Pancreas is atrophic. The adrenals are normal. Kidneys have perinephric stranding bilaterally. There are no renal calculi. No hydronephrosis. There is a lobulated contour to the left kidney at the interpolar portion there is a rounded isodense masslike portion has cystic Hounsfield units. Smaller adjacent rounded lesion has cystic Hounsfield units. Aorta is moderately calcific. Pelvis there is no evidence of bowel obstruction there is severe uncomplicated diverticulosis There is some high density fluid in the region of the cecum. This may represent radiopaque medication. Blood cannot be entirely excluded. This is intraluminal. The bladder is completely decompressed with a Calvillo catheter. Degenerative changes are present in the lumbar spine. No acute fracture or bone destruction is identified no suspicious bone lesions are seen. 1. Very small amount of high-density fluid in the cecum which may be related to radiopaque medication intraluminal blood is not entirely excludable. 2. No evidence of acute fracture or suspicious bone lesion. 3. Remote nonunion fracture left ninth rib Other chronic findings are detailed in the above report **This report has been created using voice recognition software. It may contain minor errors which are inherent in voice recognition technology. ** Final report electronically signed by Dr. Pao Maxwell on 8/4/2020 9:10 AM    Cta Neck W Othelia Linear Contrast    Result Date: some calcified atherosclerosis at the origin the left vertebral artery. There is no associated stenosis. The remainder the left vertebral artery is normal. There is no vascular injury. The vertebral arteries are codominant. Both vertebral arteries are carefully assessed at the C2 level. No vascular  injury is noted. Intracranial cerebral circulation: There is calcified atherosclerosis of the cavernous segments of the internal carotid arteries bilaterally. No severe stenosis is noted. Axial source data: The patient has a pacemaker. There are old fractures of posterior upper left ribs. There are degenerative changes of the cervical spine. There are fractures of the C2 vertebral body. The right aspect of the sphenoid sinus is opacified. The other paranasal sinuses are normally aerated. 1. No evidence of vascular injury in the neck. 2. Atherosclerosis of both carotid bulbs without hemodynamically significant stenosis **This report has been created using voice recognition software. It may contain minor errors which are inherent in voice recognition technology. ** Final report electronically signed by Dr. Lesvia Lewis on 8/5/2020 7:13 AM    Xr Chest Portable    Result Date: 8/4/2020  PROCEDURE: XR CHEST PORTABLE CLINICAL INFORMATION: Fall technologist notes state C2 fracture. COMPARISON: No prior study. TECHNIQUE: AP portable chest radiograph performed. FINDINGS: POSTSURGICAL CHANGES: None. LINES/TUBES/MECHANICAL DEVICES: 1. There is a left subclavian pacemaker with leads overlying the right atrium and the right ventricle. TRACHEA/HEART/MEDIASTINUM/HILUM: 1. The cardiac silhouette is upper limits of normal size. 2. There is atheromatous calcification along the thoracic aorta. LUNG MEDINA: Normal. OTHER: None. PNEUMOTHORAX:  None. OSSEOUS STRUCTURES: 1. No acute osseous abnormality. 2. The bony structures are osteopenic. 3. No obvious rib fracture is identified. 4. There is mild levoscoliosis of the thoracic spine. 1. There is no acute cardiopulmonary process. 2. If there is clinical concern for rib fractures, then a CT examination of the chest would be recommended. **This report has been created using voice recognition software. It may contain minor errors which are inherent in voice recognition technology. ** Final report electronically signed by Dr. Karthikeyan Ortega on 8/4/2020 8:16 AM    Ct Interpretation Of Outside Images    Result Date: 8/4/2020  PROCEDURE: CT INTERPRETATION OF OUTSIDE IMAGES CLINICAL INFORMATION: trauma. COMPARISON: No prior study. TECHNIQUE: Noncontrast cervical spine CT was performed at [facility] on 8/4/2020 at 4:45 AM and submitted for interpretation. FINDINGS: Vertebrae: There is straightening of the cervical spine which may be due to patient positioning, pain, or muscle spasm. There is a fracture through the upper C2 vertebral body consistent with a type III dens fracture, extending into the left C2 lateral mass and at the junction of the base of the dens and the lateral mass on the right. The left lateral mass fragments are mildly depressed/displaced. The fracture on the left extends to multiple walls of the the left C2 foramen transversarium. There is displacement of fracture fragments resulting in a moderate to severe stenosis of the foramen. . Recommend neck CTA to evaluate for possible left vertebral artery injury. There is no subluxation. There is multilevel endplate spondylosis, uncovertebral joint DJD, and facet joint DJD. Disc spaces/neural foramina: There is multilevel degenerative disc disease, most severe between C3-4 and C6-7. At C3-4 there is a disc osteophyte complex with mild spinal stenosis. At C4-5 there is a disc osteophyte complex with mild to moderate spinal stenosis and cord impingement. At C5-6 there is a mild disc osteophyte complex without significant spinal stenosis or cord impingement. There are multilevel bilateral neural foraminal stenoses.  Spinal canal: There is no obvious epidural hematoma. Soft tissues: Prevertebral soft tissues are normal. There are lower right neck surgical clips adjacent to the right thyroid lobe. There are bilateral carotid artery calcified plaques. Imaged lungs: The imaged lung apices are clear. Sinuses: The imaged sinuses are clear. . Mastoids: The imaged mastoid air cells are clear. Type III dens fracture through the upper C2 vertebral body extending into the left lateral mass with displacement of fragments into the left C2 foramen transversarium result in a moderate to severe stenosis of the foramen. Recommend neck CTA to evaluate  for possible left vertebral artery injury. Multilevel degenerative disc disease and DJD. Multilevel disc osteophyte complexes with varying severity spinal stenosis and cord impingement at C4-5. **This report has been created using voice recognition software. It may contain minor errors which are inherent in voice recognition technology. ** Final report electronically signed by Dr. Cheryl Sim on 8/4/2020 9:25 PM    Ct Interpretation Of Outside Images    Result Date: 8/4/2020  PROCEDURE: CT INTERPRETATION OF OUTSIDE IMAGES CLINICAL INFORMATION: CT cervical spine, fall, C2 fx. COMPARISON: None TECHNIQUE: Noncontrast brain CT was performed at [facility] on 8/4/2020 at 4:42 AM and submitted for interpretation. Delbert Ge FINDINGS: Brain: There is no acute ischemic infarct, hemorrhage, midline shift, mass, or mass effect. There are mild to moderate deep white matter hypodensities, nonspecific in nature but probably representing small vessel chronic ischemic changes. There is age appropriate cortical atrophy. Ventricles: The ventricles, cisterns and cortical sulci are enlarged concordant with the moderate degree of age-appropriate atrophy. No obstructive hydrocephalus. Skull base/calvarium/osseous structures: Unremarkable Soft tissues: Unremarkable Intraorbital contents: Unremarkable Sinuses:  There is severe right sphenoid sinus disease. The remainder of the imaged sinuses are clear. Mastoids: Unremarkable; the mastoid air cells are clear. No acute ischemic infarct, hemorrhage, or mass effect. Senescent changes as discussed above. **This report has been created using voice recognition software. It may contain minor errors which are inherent in voice recognition technology. ** Final report electronically signed by Dr. Lauren Rubin on 8/4/2020 9:13 PM      Labs:   Recent Results (from the past 72 hour(s))   POCT glucose    Collection Time: 08/08/20  6:27 AM   Result Value Ref Range    POC Glucose 140 (H) 70 - 108 mg/dl   POCT glucose    Collection Time: 08/08/20 11:07 AM   Result Value Ref Range    POC Glucose 237 (H) 70 - 108 mg/dl   POCT glucose    Collection Time: 08/08/20  4:16 PM   Result Value Ref Range    POC Glucose 161 (H) 70 - 108 mg/dl   POCT glucose    Collection Time: 08/08/20  8:14 PM   Result Value Ref Range    POC Glucose 185 (H) 70 - 108 mg/dl   POCT glucose    Collection Time: 08/09/20  6:20 AM   Result Value Ref Range    POC Glucose 169 (H) 70 - 108 mg/dl   POCT glucose    Collection Time: 08/09/20 10:57 AM   Result Value Ref Range    POC Glucose 175 (H) 70 - 108 mg/dl   POCT glucose    Collection Time: 08/09/20  4:13 PM   Result Value Ref Range    POC Glucose 192 (H) 70 - 108 mg/dl   POCT glucose    Collection Time: 08/09/20  9:45 PM   Result Value Ref Range    POC Glucose 165 (H) 70 - 108 mg/dl   POCT glucose    Collection Time: 08/10/20  6:51 AM   Result Value Ref Range    POC Glucose 151 (H) 70 - 108 mg/dl   Basic Metabolic Panel    Collection Time: 08/10/20  7:28 AM   Result Value Ref Range    Sodium 140 135 - 145 meq/L    Potassium 4.4 3.5 - 5.2 meq/L    Chloride 108 98 - 111 meq/L    CO2 21 (L) 23 - 33 meq/L    Glucose 132 (H) 70 - 108 mg/dL    BUN 29 (H) 7 - 22 mg/dL    CREATININE 1.1 0.4 - 1.2 mg/dL    Calcium 8.7 8.5 - 10.5 mg/dL   Anion Gap    Collection Time: 08/10/20  7:28 AM   Result Value Ref Range Anion Gap 11.0 8.0 - 16.0 meq/L   Glomerular Filtration Rate, Estimated    Collection Time: 08/10/20  7:28 AM   Result Value Ref Range    Est, Glom Filt Rate 47 (A) ml/min/1.73m2   POCT glucose    Collection Time: 08/10/20 10:43 AM   Result Value Ref Range    POC Glucose 182 (H) 70 - 108 mg/dl   Urine with Reflexed Micro    Collection Time: 08/10/20 12:00 PM   Result Value Ref Range    Glucose, Ur NEGATIVE NEGATIVE mg/dl    Bilirubin Urine NEGATIVE NEGATIVE    Ketones, Urine NEGATIVE NEGATIVE    Specific Gravity, Urine 1.008 1.002 - 1.03    Blood, Urine LARGE (A) NEGATIVE    pH, UA 5.0 5.0 - 9.0    Protein,  (A) NEGATIVE    Urobilinogen, Urine 0.2 0.0 - 1.0 eu/dl    Nitrite, Urine NEGATIVE NEGATIVE    Leukocyte Esterase, Urine LARGE (A) NEGATIVE    Color, UA RED (A) STRAW-YELL    Character, Urine CLOUDY (A) CLEAR-SL C    RBC, UA > 200 0-2/hpf /hpf    WBC, UA 15-25 0-4/hpf /hpf    Epithelial Cells, UA 0-2 3-5/hpf /hpf    Amorphous, UA URATES NONE SEEN    Bacteria, UA FEW FEW/NONE S /hpf    Casts UA NONE SEEN NONE SEEN /lpf    Crystals, UA NONE SEEN NONE SEEN    Renal Epithelial, UA NONE SEEN NONE SEEN    Yeast, UA NONE SEEN NONE SEEN    CASTS 2 NONE SEEN NONE SEEN /lpf    MISCELLANEOUS 2 NONE SEEN    POCT glucose    Collection Time: 08/10/20  3:56 PM   Result Value Ref Range    POC Glucose 211 (H) 70 - 108 mg/dl       Discharge condition: Stable  Disposition:  To a non-[facility] facility  Time spent on discharge: >60 minutes     Electronically signed by LYNDSAY Houser on 8/10/2020 at 8:03 PM

## 2020-08-12 LAB
ORGANISM: ABNORMAL
ORGANISM: ABNORMAL
URINE CULTURE REFLEX: ABNORMAL
URINE CULTURE REFLEX: ABNORMAL

## 2020-08-24 ENCOUNTER — HOSPITAL ENCOUNTER (EMERGENCY)
Age: 85
Discharge: NURSING, REHAB OR LONG-TERM CARE | End: 2020-08-24
Attending: FAMILY MEDICINE
Payer: MEDICARE

## 2020-08-24 VITALS
RESPIRATION RATE: 18 BRPM | DIASTOLIC BLOOD PRESSURE: 80 MMHG | OXYGEN SATURATION: 95 % | SYSTOLIC BLOOD PRESSURE: 132 MMHG | WEIGHT: 159 LBS | TEMPERATURE: 97.9 F | BODY MASS INDEX: 26.46 KG/M2 | HEART RATE: 83 BPM

## 2020-08-24 LAB
ALBUMIN SERPL-MCNC: 3.4 G/DL (ref 3.5–5.1)
ALP BLD-CCNC: 83 U/L (ref 38–126)
ALT SERPL-CCNC: 11 U/L (ref 11–66)
ANION GAP SERPL CALCULATED.3IONS-SCNC: 9 MEQ/L (ref 8–16)
AST SERPL-CCNC: 16 U/L (ref 5–40)
BACTERIA: ABNORMAL
BASOPHILS # BLD: 0.4 %
BASOPHILS ABSOLUTE: 0 THOU/MM3 (ref 0–0.1)
BILIRUB SERPL-MCNC: 0.2 MG/DL (ref 0.3–1.2)
BILIRUBIN URINE: NEGATIVE
BLOOD, URINE: NEGATIVE
BUN BLDV-MCNC: 59 MG/DL (ref 7–22)
CALCIUM SERPL-MCNC: 8.2 MG/DL (ref 8.5–10.5)
CASTS: ABNORMAL /LPF
CASTS: ABNORMAL /LPF
CHARACTER, URINE: CLEAR
CHLORIDE BLD-SCNC: 112 MEQ/L (ref 98–111)
CO2: 19 MEQ/L (ref 23–33)
COLOR: YELLOW
CREAT SERPL-MCNC: 1.4 MG/DL (ref 0.4–1.2)
CRYSTALS: ABNORMAL
EOSINOPHIL # BLD: 0.6 %
EOSINOPHILS ABSOLUTE: 0.1 THOU/MM3 (ref 0–0.4)
EPITHELIAL CELLS, UA: ABNORMAL /HPF
ERYTHROCYTE [DISTWIDTH] IN BLOOD BY AUTOMATED COUNT: 14.6 % (ref 11.5–14.5)
ERYTHROCYTE [DISTWIDTH] IN BLOOD BY AUTOMATED COUNT: 51.9 FL (ref 35–45)
GFR SERPL CREATININE-BSD FRML MDRD: 35 ML/MIN/1.73M2
GLUCOSE BLD-MCNC: 147 MG/DL (ref 70–108)
GLUCOSE, URINE: NEGATIVE MG/DL
HCT VFR BLD CALC: 36.9 % (ref 37–47)
HEMOGLOBIN: 11.1 GM/DL (ref 12–16)
IMMATURE GRANS (ABS): 0.1 THOU/MM3 (ref 0–0.07)
IMMATURE GRANULOCYTES: 1 %
KETONES, URINE: NEGATIVE
LEUKOCYTE EST, POC: ABNORMAL
LYMPHOCYTES # BLD: 14.8 %
LYMPHOCYTES ABSOLUTE: 1.5 THOU/MM3 (ref 1–4.8)
MCH RBC QN AUTO: 29.5 PG (ref 26–33)
MCHC RBC AUTO-ENTMCNC: 30.1 GM/DL (ref 32.2–35.5)
MCV RBC AUTO: 98.1 FL (ref 81–99)
MISCELLANEOUS LAB TEST RESULT: ABNORMAL
MONOCYTES # BLD: 5.8 %
MONOCYTES ABSOLUTE: 0.6 THOU/MM3 (ref 0.4–1.3)
NITRITE, URINE: NEGATIVE
NUCLEATED RED BLOOD CELLS: 0 /100 WBC
OSMOLALITY CALCULATION: 298.6 MOSMOL/KG (ref 275–300)
PH UA: 5 (ref 5–9)
PLATELET # BLD: 148 THOU/MM3 (ref 130–400)
PMV BLD AUTO: 10.2 FL (ref 9.4–12.4)
POTASSIUM REFLEX MAGNESIUM: 4.2 MEQ/L (ref 3.5–5.2)
PROTEIN UA: NEGATIVE MG/DL
RBC # BLD: 3.76 MILL/MM3 (ref 4.2–5.4)
RBC URINE: ABNORMAL /HPF
RENAL EPITHELIAL, UA: ABNORMAL
SEG NEUTROPHILS: 77.4 %
SEGMENTED NEUTROPHILS ABSOLUTE COUNT: 7.7 THOU/MM3 (ref 1.8–7.7)
SODIUM BLD-SCNC: 140 MEQ/L (ref 135–145)
SPECIFIC GRAVITY UA: 1.02 (ref 1–1.03)
TOTAL PROTEIN: 6.4 G/DL (ref 6.1–8)
UROBILINOGEN, URINE: 0.2 EU/DL (ref 0–1)
WBC # BLD: 10 THOU/MM3 (ref 4.8–10.8)
WBC UA: ABNORMAL /HPF
YEAST: ABNORMAL

## 2020-08-24 PROCEDURE — APPSS180 APP SPLIT SHARED TIME > 60 MINUTES: Performed by: PHYSICIAN ASSISTANT

## 2020-08-24 PROCEDURE — 6820000001 HC L2 TRAUMA SURGERY EVALUATION: Performed by: SURGERY

## 2020-08-24 PROCEDURE — 85025 COMPLETE CBC W/AUTO DIFF WBC: CPT

## 2020-08-24 PROCEDURE — 80053 COMPREHEN METABOLIC PANEL: CPT

## 2020-08-24 PROCEDURE — 6370000000 HC RX 637 (ALT 250 FOR IP)

## 2020-08-24 PROCEDURE — 36415 COLL VENOUS BLD VENIPUNCTURE: CPT

## 2020-08-24 PROCEDURE — 99285 EMERGENCY DEPT VISIT HI MDM: CPT

## 2020-08-24 PROCEDURE — 6370000000 HC RX 637 (ALT 250 FOR IP): Performed by: FAMILY MEDICINE

## 2020-08-24 PROCEDURE — 81001 URINALYSIS AUTO W/SCOPE: CPT

## 2020-08-24 PROCEDURE — 87086 URINE CULTURE/COLONY COUNT: CPT

## 2020-08-24 RX ORDER — ACETAMINOPHEN 325 MG/1
650 TABLET ORAL ONCE
Status: COMPLETED | OUTPATIENT
Start: 2020-08-24 | End: 2020-08-24

## 2020-08-24 RX ORDER — SULFAMETHOXAZOLE AND TRIMETHOPRIM 400; 80 MG/1; MG/1
1 TABLET ORAL 2 TIMES DAILY
COMMUNITY

## 2020-08-24 RX ORDER — ACETAMINOPHEN 325 MG/1
TABLET ORAL
Status: COMPLETED
Start: 2020-08-24 | End: 2020-08-24

## 2020-08-24 RX ADMIN — ACETAMINOPHEN 650 MG: 325 TABLET ORAL at 18:51

## 2020-08-24 RX ADMIN — ACETAMINOPHEN 650 MG: 325 TABLET ORAL at 10:44

## 2020-08-24 ASSESSMENT — PAIN DESCRIPTION - PAIN TYPE: TYPE: ACUTE PAIN

## 2020-08-24 ASSESSMENT — PAIN DESCRIPTION - LOCATION: LOCATION: NECK

## 2020-08-24 ASSESSMENT — PAIN SCALES - GENERAL
PAINLEVEL_OUTOF10: 5
PAINLEVEL_OUTOF10: 5

## 2020-08-24 NOTE — CONSULTS
Trauma H&P     Patient:  Radha Khan  Admit date: 8/24/2020   YOB: 1929 Date of Evaluation: 8/24/2020  MRN: [id number]  Acct: [de-identified]    Injury Date:8/17/2020  Injury time: evening  PCP: Kari Squires MD   Referring physician: Dr. Willow Charles    Time of Trauma Surgeon Notification:  [phone]  Time of LUPILLO Arrival: 1  Time of Trauma Surgeon Arrival:      Assessment:    Fall, 1 week prior  Neck pain  Dens fracture, with acute changes  Plan:    Patient is vitally stable and mentating appropriately at baseline on exam. CT neck interpretation from [facility] was verbally reported as worsening of dense fracture. Dr. Jonnie Gaytan notified due to his previous treatment of patient stating if no lateral displacement on CT and patient is not desiring surgical intervention, ok for discharge with follow up for CT and outpatient visit in 1 month with neurosurgery. No apparent life threatening injuries evident on physical exam. Trauma LUPILLO spoke with Dr. Bill Villalta radiologist who confirmed there is worsening vertical separation of dense fracture on repeat CT neck, but no lateral displacement that would cause concern for neurological impairment at this time. Per discussion with neurosurgery, patient stable for discharge to [facility] from trauma and neurosurgery perspective. Patient agreeable to discharge disposition. Patient informed that a subsequent fall could result in permanent injury or even death. Patient continued to desire discharge. Daughter agreeable to discuss closer monitoring at [facility] after discharge. Care in coordination with trauma surgeon and ED provider. Discharge per ED provider. Thank you for consultation.        Activation: []Level I (Trauma Alert) []Level II (Injury Call) [x]Level III (Trauma Consult) [] Downgraded (Time: )   Mode of Arrival: EMS transportation  Referring Facility: [facility]  Loss of Consciousness [x]No []Yes[]Unknown  Duration(min)  Mechanism of Injury:  []Motor Vehicle crash   []Single Vehicle [] []Passenger []Scene Fatality []Front Seat  []Restrained   []Air Bag Deployed   []Ejected []Rollover []Pedestrian []Trapped   Type of vehicle:   Protective Devices:   []Motorcycle  Wearing Helmet []Yes []No  []Bicycle  Wearing Helmet []Yes []No  [x]Fall   Distance - Seated   []Assault    Abuse Reported []Yes []No  []Gunshot  []Stabbing  []Work Related  []Burn: []Flame []Scald []Electrical []Chemical []Contact []Inhalation []House Fire  []Other:   Patient Active Problem List   Diagnosis    [facility] disease, stage III (moderate) [facility] [address]    Hyperlipidemia    Hypertension    Atherosclerotic heart disease    Fall, accidental, initial encounter    Closed type III fracture of odontoid process ([facility] [address]    Acute cystitis without hematuria     Subjective   Chief Complaint: Neck pain    History of Present Illness:    Ms. Suzy Cruz is a 79 Y/O male presenting at [facility] by trauma consult, brought by EMS from [facility] following a fall 1 week ago  without LOC. Per patient was seen yesterday at [facility] ED for pain in left neck following a fall from the toilet. Patient recently admitted to [facility] for dens fracture thought to be chronic. CT of neck at [facility] read as worsening of dens fracture, instigated transfer to [facility]. Patient complaining of 8/10 pain in left neck and 6/10 headache. Patient denies chest pain, shortness of breath, cough, headache, dizziness, lightheadedness, numbness, paraesthesias, weakness,abdominal pain, nausea, vomiting, diarrhea, back pain. Currently taking tramadol and toradol for pain, but patient daughter at bedside states tramadol causes patient to be confused. Dr. Evelio Ba neurosurgeon contacted due to his previous care of patient during admission for similar injury 1 month ago.  Dr. Evelio Ba stated that if patient desires surgery, admission with surgical intervention is appropriate; if she does not desire surgery then discharge with outpatient neurosurgery follow Occupational History    None   Social Needs    Financial resource strain: None    Food insecurity     Worry: None     Inability: None    Transportation needs     Medical: None     Non-medical: None   Tobacco Use    Smoking status: Never Smoker    Smokeless tobacco: Never Used   Substance and Sexual Activity    Alcohol use: Not Currently     Frequency: Never    Drug use: Never    Sexual activity: None   Lifestyle    Physical activity     Days per week: None     Minutes per session: None    Stress: None   Relationships    Social connections     Talks on phone: None     Gets together: None     Attends Caodaism service: None     Active member of club or organization: None     Attends meetings of clubs or organizations: None     Relationship status: None    Intimate partner violence     Fear of current or ex partner: None     Emotionally abused: None     Physically abused: None     Forced sexual activity: None   Other Topics Concern    None   Social History Narrative    None     History reviewed. No pertinent family history. Home medications:    Previous Medications    ACETAMINOPHEN (TYLENOL) 325 MG TABLET    Take 650 mg by mouth every 6 hours as needed for Pain    ACETAMINOPHEN (TYLENOL) 500 MG TABLET    Take 1,000 mg by mouth nightly    ASPIRIN 81 MG EC TABLET    Take 81 mg by mouth daily. ATORVASTATIN (LIPITOR) 40 MG TABLET    Take 40 mg by mouth nightly    CLOPIDOGREL (PLAVIX) 75 MG TABLET    Take 75 mg by mouth daily. DILTIAZEM (DILACOR XR) 180 MG XR CAPSULE    Take 180 mg by mouth daily. FLUTICASONE (FLONASE) 50 MCG/ACT NASAL SPRAY    1 spray by Each Nostril route nightly    FUROSEMIDE (LASIX) 40 MG TABLET    Take 40 mg by mouth daily    ISOSORBIDE MONONITRATE (IMDUR) 30 MG CR TABLET    Take 30 mg by mouth daily. LIDOCAINE (LIDODERM) 5 %    Place 1 patch onto the skin daily 12 hours on, 12 hours off to left lateral thorax.     LOSARTAN (COZAAR) 25 MG TABLET    Take 12.5 mg by mouth daily     METOPROLOL (LOPRESSOR) 25 MG TABLET    Take 25 mg by mouth 2 times daily. NITROGLYCERIN (NITROSTAT) 0.4 MG SL TABLET    Place 0.4 mg under the tongue every 5 minutes as needed. OMEGA-3 FATTY ACIDS (FISH OIL) 1000 MG CAPS    Take 2,000 mg by mouth daily.     POLYETHYLENE GLYCOL (GLYCOLAX) 17 GM/SCOOP POWDER    Take 17 g by mouth daily as needed    POTASSIUM CHLORIDE (KLOR-CON) 10 MEQ EXTENDED RELEASE TABLET    Take 10 mEq by mouth daily    SERTRALINE (ZOLOFT) 100 MG TABLET    Take 100 mg by mouth nightly    SITAGLIPTIN (JANUVIA) 50 MG TABLET    Take 50 mg by mouth daily    SULFAMETHOXAZOLE-TRIMETHOPRIM (BACTRIM;SEPTRA) 400-80 MG PER TABLET    Take 1 tablet by mouth 2 times daily    VITAMIN B-1 (THIAMINE) 100 MG TABLET    Take 100 mg by mouth daily       Hospital medications:  Scheduled Meds:  Continuous Infusions:  PRN Meds:  Objective   ED TRIAGE VITALS  BP: 109/65, Temp: 97.9 °F (36.6 °C), Pulse: 79, Resp: 18, SpO2: 95 %  [place] Coma Scale  Eye Opening: Spontaneous  Best Verbal Response: Oriented  Best Motor Response: Obeys commands  [place] Coma Scale Score: 15  Results for orders placed or performed during the hospital encounter of 08/24/20   Comprehensive Metabolic Panel w/ Reflex to MG   Result Value Ref Range    Glucose 147 (H) 70 - 108 mg/dL    CREATININE 1.4 (H) 0.4 - 1.2 mg/dL    BUN 59 (H) 7 - 22 mg/dL    Sodium 140 135 - 145 meq/L    Potassium reflex Magnesium 4.2 3.5 - 5.2 meq/L    Chloride 112 (H) 98 - 111 meq/L    CO2 19 (L) 23 - 33 meq/L    Calcium 8.2 (L) 8.5 - 10.5 mg/dL    AST 16 5 - 40 U/L    Alkaline Phosphatase 83 38 - 126 U/L    Total Protein 6.4 6.1 - 8.0 g/dL    Alb 3.4 (L) 3.5 - 5.1 g/dL    Total Bilirubin 0.2 (L) 0.3 - 1.2 mg/dL    ALT 11 11 - 66 U/L   CBC Auto Differential   Result Value Ref Range    WBC 10.0 4.8 - 10.8 thou/mm3    RBC 3.76 (L) 4.20 - 5.40 mill/mm3    Hemoglobin 11.1 (L) 12.0 - 16.0 gm/dl    Hematocrit 36.9 (L) 37.0 - 47.0 %    MCV 98.1 81.0 - 99.0 fL MCH 29.5 26.0 - 33.0 pg    MCHC 30.1 (L) 32.2 - 35.5 gm/dl    RDW-CV 14.6 (H) 11.5 - 14.5 %    RDW-SD 51.9 (H) 35.0 - 45.0 fL    Platelets 013 575 - 484 thou/mm3    MPV 10.2 9.4 - 12.4 fL    Seg Neutrophils 77.4 %    Lymphocytes 14.8 %    Monocytes 5.8 %    Eosinophils 0.6 %    Basophils 0.4 %    Immature Granulocytes 1.0 %    Segs Absolute 7.7 1.8 - 7.7 thou/mm3    Lymphocytes Absolute 1.5 1.0 - 4.8 thou/mm3    Monocytes Absolute 0.6 0.4 - 1.3 thou/mm3    Eosinophils Absolute 0.1 0.0 - 0.4 thou/mm3    Basophils Absolute 0.0 0.0 - 0.1 thou/mm3    Immature Grans (Abs) 0.10 (H) 0.00 - 0.07 thou/mm3    nRBC 0 /100 wbc   Anion Gap   Result Value Ref Range    Anion Gap 9.0 8.0 - 16.0 meq/L   Glomerular Filtration Rate, Estimated   Result Value Ref Range    Est, Glom Filt Rate 35 (A) ml/min/1.73m2   Osmolality   Result Value Ref Range    Osmolality Calc 298.6 275.0 - 300 mOsmol/kg   Microscopic Urinalysis   Result Value Ref Range    Glucose, Urine NEGATIVE NEGATIVE mg/dl    Bilirubin Urine NEGATIVE NEGATIVE    Ketones, Urine NEGATIVE NEGATIVE    Specific Gravity, UA 1.016 1.002 - 1.03    Blood, Urine NEGATIVE NEGATIVE    pH, UA 5.0 5.0 - 9.0    Protein, UA NEGATIVE NEGATIVE mg/dl    Urobilinogen, Urine 0.2 0.0 - 1.0 eu/dl    Nitrite, Urine NEGATIVE NEGATIVE    Leukocytes, UA SMALL (A) NEGATIVE    Color, UA YELLOW YELLOW-STR    Character, Urine CLEAR [id number]    RBC, UA NONE SEEN 0-2/hpf /hpf    WBC, UA 5-9 0-4/hpf /hpf    Epithelial Cells, UA 0-2 3-5/hpf /hpf    Bacteria, UA NONE SEEN FEW/NONE S    Casts 4-8 HYALINE NONE SEEN /lpf    Crystals NONE SEEN NONE SEEN    Renal Epithelial, UA NONE SEEN NONE SEEN    Yeast, UA NONE SEEN NONE SEEN    Casts NONE SEEN /lpf    Miscellaneous Lab Test Result NONE SEEN        Physical Exam:  Patient Vitals for the past 24 hrs:   BP Temp Temp src Pulse Resp SpO2 Weight   08/24/20 1307 109/65 -- -- 79 -- -- --   08/24/20 1120 (!) 118/59 -- -- 76 -- 95 % --   08/24/20 1047 131/76 -- -- 80 18 95 % --   08/24/20 0934 (!) 140/68 97.9 °F (36.6 °C) Oral 75 18 95 % 159 lb (72.1 kg)     Primary Assessment:  Airway: Patent, trachea midline  Breathing: Breath sounds present and equal bilaterally, spontaneous, and unlabored  Circulation: Hemodynamically stable, 2+ central and peripheral pulses. Disability: DE LUNA x 4, following commands. GCS =15    Secondary Assessment:  General: Alert, NAD. Head: Normocephalic, no signs of cranial trauma, no tenderness with palpation of scalp. mid face stable, Tympanic membranes intact, Nares patent bilaterally, no epistaxis. Mouth clear of foreign bodies, no lacerations or abrasions. Eyes: PERRLA, EOMI, Nontraumatic  Neurologic: A & O x3. Following commands. CN 2-12 intact  Neck: Immobilized [place] collar. Unable to assess neck. Lungs: Clear to auscultation bilaterally. Chest Wall: Chest rise symmetrical.  Chest wall without tenderness to palpation. No crepitus, deformities, lacerations, or abrasions. Heart: RRR. Normal S1/S2. No obvious M/G/R. Abdomen:  Soft, NTTP. No guarding. Non-peritoneal.  Pelvis:  NTTP, stable to compression. GI/: No blood at the urinary meatus. No gross hematuria. Extremities: No gross deformities. PMS intact. Radial /DP/PT pulses 2+ bilaterally. No tenderness with joint or long bone palpation, full passive ROM of extremities. Skin: Skin warm and dry. Normal for ethnicity. Radiology:     CT COMPARISON OF OUTSIDE FILMS   Final Result      CT COMPARISON OF OUTSIDE FILMS   Final Result      XR COMPARISON OF OUTSIDE FILMS   Final Result      CT INTERPRETATION OF OUTSIDE IMAGES    (Results Pending)     Fast Exam: No - workup performed by outside facility    Electronically signed by LYNDSAY Joya on 8/24/2020 at 1:39 PM    Above discussed and I agree with LYNDSAY Joya. See my additional comments below for updated orders and plan. Labs, cultures, and radiographs where available were reviewed.   I discussed patient concerns with [facility] [place] and instructions were given. Please see our orders for the updated patient care plan.     Electronically signed by Margarita Craven MD on 8/24/20 at 5:38 PM EDT

## 2020-08-24 NOTE — ED NOTES
Patient resting in bed. Respirations easy and unlabored. No distress noted. Call light within reach.         Maria De Jesus Sauer RN  08/24/20 [id number]

## 2020-08-24 NOTE — ED NOTES
Patient resting in bed. Respirations easy and unlabored. No distress noted. Call light within reach. Updated on POC. Will monitor.       Milagros Alex RN  08/24/20 [id number]

## 2020-08-24 NOTE — ED NOTES
Patient resting in bed. Respirations easy and unlabored. No distress noted. Call light within reach. Daughter at bedside. Will monitor.       Elizabeth Rojas RN  08/24/20 [id number]

## 2020-08-24 NOTE — ED NOTES
Patient resting in bed. Respirations easy and unlabored. No distress noted. Call light within reach. Updated on POC. Will monitor.       Brian Orourke RN  08/24/20 [id number]

## 2020-08-24 NOTE — ED NOTES
Pt leaving facility with [facility] at this time.       Lucas Nathan RN  08/24/20 1958

## 2020-08-24 NOTE — ED PROVIDER NOTES
EMERGENCY DEPARTMENT ENCOUNTER     CHIEF COMPLAINT   Chief Complaint   Patient presents with   Jose Chand is a 80 y.o. female with recent admission for fall with resulting type 3 dens fracture (C2) who presents with a purported worse injury after a fall at the nursing home last week, for which she did not seek medical attention. The onset was last week while she was getting up to go to the bathroom. The reason why the patient fell was accidental. Of note, on 8/4/20, she was admitted after a fall with resultant C2 fracture, but she refused additional imaging such as MRI or surgery. Pt is now made DNR-CC. The patient complains of neck pain without any extremity weakness. The quality is no severe pain or weakness. The patient has no associated tingling or arm or leg weakness. REVIEW OF SYSTEMS   Neurologic: No head injury or LOC   Neck: +neck injury  Cardiac: No Chest Pain, No syncope   Respiratory: Nodifficulty breathing   GI:No abdominal pain or vomiting   See history of present illness   All other review of systems were reviewed and are otherwise negative.      PAST MEDICAL & SURGICAL HISTORY   Past Medical History:   Diagnosis Date    Anemia     Arthritis     Atherosclerotic heart disease     [facility]est[facility] failure) [facility]     Chronic kidney disease, stage III (moderate) ([facility])     Constipation     Depression     Diabetes mellitus ([facility] [address]     Diverticulitis     Falls     GERD (gastroesophageal reflux disease)     Glaucoma     Hyperlipidemia     Hypertension     Migraine     Rheumatic fever       Past Surgical History:   Procedure Laterality Date    APPENDECTOMY      CARDIAC SURGERY      stent    CYSTOCELE REPAIR      HYSTERECTOMY      JOINT REPLACEMENT      left knee    PACEMAKER PLACEMENT      RECTOCELE REPAIR      THYROIDECTOMY, PARTIAL          CURRENT MEDICATIONS   Current Outpatient Rx   Medication Sig Dispense Refill    sulfamethoxazole-trimethoprim (BACTRIM;SEPTRA) 400-80 MG per tablet Take 1 tablet by mouth 2 times daily      SITagliptin (JANUVIA) 50 MG tablet Take 50 mg by mouth daily      atorvastatin (LIPITOR) 40 MG tablet Take 40 mg by mouth nightly      fluticasone (FLONASE) 50 MCG/ACT nasal spray 1 spray by Each Nostril route nightly      furosemide (LASIX) 40 MG tablet Take 40 mg by mouth daily      lidocaine (LIDODERM) 5 % Place 1 patch onto the skin daily 12 hours on, 12 hours off to left lateral thorax.  potassium chloride (KLOR-CON) 10 MEQ extended release tablet Take 10 mEq by mouth daily      sertraline (ZOLOFT) 100 MG tablet Take 100 mg by mouth nightly      acetaminophen (TYLENOL) 500 MG tablet Take 1,000 mg by mouth nightly      vitamin B-1 (THIAMINE) 100 MG tablet Take 100 mg by mouth daily      polyethylene glycol (GLYCOLAX) 17 GM/SCOOP powder Take 17 g by mouth daily as needed      acetaminophen (TYLENOL) 325 MG tablet Take 650 mg by mouth every 6 hours as needed for Pain      Omega-3 Fatty Acids (FISH OIL) 1000 MG CAPS Take 2,000 mg by mouth daily.  diltiazem (DILACOR XR) 180 MG XR capsule Take 180 mg by mouth daily.  losartan (COZAAR) 25 MG tablet Take 12.5 mg by mouth daily       aspirin 81 MG EC tablet Take 81 mg by mouth daily.  clopidogrel (PLAVIX) 75 MG tablet Take 75 mg by mouth daily.  metoprolol (LOPRESSOR) 25 MG tablet Take 25 mg by mouth 2 times daily.  isosorbide mononitrate (IMDUR) 30 MG CR tablet Take 30 mg by mouth daily.  nitroGLYCERIN (NITROSTAT) 0.4 MG SL tablet Place 0.4 mg under the tongue every 5 minutes as needed. ALLERGIES   Allergies   Allergen Reactions    Fluoxetine     Levofloxacin     Pcn [Penicillins]     Quinolones         SOCIAL & FAMILY HISTORY   Social History     Socioeconomic History    Marital status:       Spouse name: None    Number of children: None    Years of education: None    Highest education level: None   Occupational History    None   Social Needs    Financial resource strain: None    Food insecurity     Worry: None     Inability: None    Transportation needs     Medical: None     Non-medical: None   Tobacco Use    Smoking status: Never Smoker    Smokeless tobacco: Never Used   Substance and Sexual Activity    Alcohol use: Not Currently     Frequency: Never    Drug use: Never    Sexual activity: None   Lifestyle    Physical activity     Days per week: None     Minutes per session: None    Stress: None   Relationships    Social connections     Talks on phone: None     Gets together: None     Attends Buddhism service: None     Active member of club or organization: None     Attends meetings of clubs or organizations: None     Relationship status: None    Intimate partner violence     Fear of current or ex partner: None     Emotionally abused: None     Physically abused: None     Forced sexual activity: None   Other Topics Concern    None   Social History Narrative    None      History reviewed. No pertinent family history.      PHYSICAL EXAM   VITAL SIGNS: /65   Pulse 79   Temp 97.9 °F (36.6 °C) (Oral)   Resp 18   Wt 159 lb (72.1 kg)   SpO2 95%   BMI 26.46 kg/m²    Constitutional: Well developed, well nourished, no acute distress   Eyes: Pupils equally round and react to light, sclera nonicteric   HENT: Atraumatic, no trismus   Neck: supple, no JVD, c-collar on; mild diffuse posterior neck tenderness   Respiratory: Lungs Clear, no retractions   Cardiovascular: Reg rate, normal rhythm, no murmurs   Vascular: DP pulses 2+ equal bilaterally   GI: Soft, nontender, normal bowel sounds   Musculoskeletal: No edema, stable pelvis, no knee or hip joint tenderness to palpation   Integument: Well hydrated, no petechiae   Neurologic: Alert & oriented, no slurred speech, no facial droop, 5/5 pedal strength and 5/5 upper extremity  strength   Psych: Pleasant affect     EKG Interpretation Interpreted by emergency department physician 8/23/20 23:20  Rhythm: Sinus   Rate: 73 BPM   Axis: normal   Ectopy: none   Conduction: normal   ST/T Segments: no acute change   Clinical Impression: Nonspecific LBBB    RADIOLOGY   CT COMPARISON OF OUTSIDE FILMS   Final Result      CT COMPARISON OF OUTSIDE FILMS   Final Result      XR COMPARISON OF OUTSIDE FILMS   Final Result      CT INTERPRETATION OF OUTSIDE IMAGES    (Results Pending)        Labs Reviewed   COMPREHENSIVE METABOLIC PANEL W/ REFLEX TO MG FOR LOW K - Abnormal; Notable for the following components:       Result Value    Glucose 147 (*)     CREATININE 1.4 (*)     BUN 59 (*)     Chloride 112 (*)     CO2 19 (*)     Calcium 8.2 (*)     Alb 3.4 (*)     Total Bilirubin 0.2 (*)     All other components within normal limits   CBC WITH AUTO DIFFERENTIAL - Abnormal; Notable for the following components:    RBC 3.76 (*)     Hemoglobin 11.1 (*)     Hematocrit 36.9 (*)     MCHC 30.1 (*)     RDW-CV 14.6 (*)     RDW-SD 51.9 (*)     Immature Grans (Abs) 0.10 (*)     All other components within normal limits   GLOMERULAR FILTRATION RATE, ESTIMATED - Abnormal; Notable for the following components:    Est, Glom Filt Rate 35 (*)     All other components within normal limits   MICROSCOPIC URINALYSIS - Abnormal; Notable for the following components:    Leukocytes, UA SMALL (*)     All other components within normal limits   CULTURE, URINE   ANION GAP   OSMOLALITY        ED COURSE & MEDICAL DECISION MAKING   Pertinent Labs & Imaging studies reviewed and interpreted. (See chart for details)   Vitals:    08/24/20 1307   BP: 109/65   Pulse: 79   Resp:    Temp:    SpO2:         Differential Diagnosis: progression of c-spine fracture, UTI, Anemia, Fracture, Dislocation, Dehydration, other. FINAL IMPRESSION   1. Dens fracture, closed, initial encounter ([facility] [address]    2.  Examination for normal comparison for clinical research    3. Closed odontoid fracture with type III morphology, initial encounter [facility])         PLAN   MDM - pt had a fall from one week ago, which might have aggravated her prior C2-C3 fracture, found last night. Pt has no focal neuro deficits. Pt may still have an UTI, will check labs. Her EKG is wnl. Will discuss case with spine once results are in. I consulted trauma team, which in turn consulted Dr. Pamela Pino. Since pt is refusing any surgery, there is no indication for admission. Pt will be discharged back to nursing home now with anticipatory guidance. I updated pt's DNR form to [facility] with my [facility], [facility].      Electronically signed by: Kailyn Truong, 8/24/2020 2:45 PM         Tamela Menjivar MD  08/24/20 2742

## 2020-08-24 NOTE — ACP (ADVANCE CARE PLANNING)
orders. Length of ACP Conversation in minutes:  1:25 mins    Conversation Outcomes:  [x] ACP discussion completed  [x] Existing advance directive reviewed with patient; no changes to patient's previously recorded wishes  [x] New Advance Directive completed  [x] Portable Do Not Rescitate prepared for Provider review and signature  [] POLST/POST/MOLST/MOST prepared for Provider review and signature      Follow-up plan:    [] Schedule follow-up conversation to continue planning  [] Referred individual to Provider for additional questions/concerns   [] Advised patient/agent/surrogate to review completed ACP document and update if needed with changes in condition, patient preferences or care setting    [x] This note routed to one or more involved healthcare providers      - The patient was resting in her bed with a neck brace on. She explained that she had fallen at the [facility] and now had to move from [place] [address] to the [facility] and hates it. Her daughter Julian Memos) was bedside and as we began discussing ACP, the daughter pointed out that they recently had her DNR completed to be a DNR-CCA so she was able to obtain therapy. This staff asked about the therapy and apparently it is a matter of the patient getting up to her chair and being able to walk to the bathroom. - The daughter also pointed out that she is the POA. This staff explained that we have no record of the patient's AD in our files and explained how to make this available for her. Also explained that her DNR was signed by and NP and in the [facility] [place], it is not honored by the [employer] [facility] unless signed by a PCP. The daughter was unaware of both - stating \"well she is going back to the facility so we can deal with it there. \" She also felt she could get the DNR completed by her family physician since they go to the same Doctor. This staff encouraged her to use the facility physician as they would have more regular contact with the patient.  This staff then asked if we

## 2020-08-24 NOTE — ED NOTES
Bedside report provided by Malena Kennedy to [facility], RN. Pt resting in bed with even and unlabored respriations. Pt states pain is a  Pt is a 4/10 at this time. Pt and family updated on plan of care. Pt has no further needs at this time. Pt call light in reach.         Ena Flores RN  08/24/20 1079

## 2020-08-24 NOTE — ED NOTES
Bed: 022A  Expected date:   Expected time:   Means of arrival: [address] [facility]  Comments:     Alexia Jane RN  08/24/20 0934

## 2020-08-24 NOTE — ED NOTES
Patient eating in bed. Respirations easy and unlabored. Daughter at bedside. Will monitor.       Jeff Coffman RN  08/24/20 1968

## 2020-08-24 NOTE — ED NOTES
[facility] [address] [facility]  08/24/20 5295 Orders for EGD placed.  Orders for Episodes of care and admissions placed    EGD  MAC  Dr. Kaiser

## 2020-08-26 LAB
ORGANISM: ABNORMAL
URINE CULTURE, ROUTINE: ABNORMAL